# Patient Record
Sex: FEMALE | Race: WHITE | Employment: OTHER | URBAN - METROPOLITAN AREA
[De-identification: names, ages, dates, MRNs, and addresses within clinical notes are randomized per-mention and may not be internally consistent; named-entity substitution may affect disease eponyms.]

---

## 2018-10-31 ENCOUNTER — HOSPITAL ENCOUNTER (INPATIENT)
Age: 83
LOS: 3 days | Discharge: HOSPICE | DRG: 064 | End: 2018-11-03
Attending: EMERGENCY MEDICINE | Admitting: HOSPITALIST
Payer: MEDICARE

## 2018-10-31 ENCOUNTER — APPOINTMENT (OUTPATIENT)
Dept: CT IMAGING | Age: 83
DRG: 064 | End: 2018-10-31
Attending: EMERGENCY MEDICINE
Payer: MEDICARE

## 2018-10-31 ENCOUNTER — APPOINTMENT (OUTPATIENT)
Dept: GENERAL RADIOLOGY | Age: 83
DRG: 064 | End: 2018-10-31
Attending: EMERGENCY MEDICINE
Payer: MEDICARE

## 2018-10-31 DIAGNOSIS — R41.4 RIGHT-SIDED VISUAL NEGLECT: ICD-10-CM

## 2018-10-31 DIAGNOSIS — R13.10 DYSPHAGIA, UNSPECIFIED TYPE: ICD-10-CM

## 2018-10-31 DIAGNOSIS — R41.82 ALTERED MENTAL STATUS, UNSPECIFIED ALTERED MENTAL STATUS TYPE: Primary | ICD-10-CM

## 2018-10-31 DIAGNOSIS — R53.81 PHYSICAL DEBILITY: ICD-10-CM

## 2018-10-31 DIAGNOSIS — E88.09 HYPOALBUMINEMIA: ICD-10-CM

## 2018-10-31 DIAGNOSIS — R63.30 FEEDING DIFFICULTIES: ICD-10-CM

## 2018-10-31 DIAGNOSIS — I63.512 CEREBROVASCULAR ACCIDENT (CVA) DUE TO STENOSIS OF LEFT MIDDLE CEREBRAL ARTERY (HCC): ICD-10-CM

## 2018-10-31 LAB
ALBUMIN SERPL-MCNC: 2.8 G/DL (ref 3.5–5)
ALBUMIN/GLOB SERPL: 0.9 {RATIO} (ref 1.1–2.2)
ALP SERPL-CCNC: 60 U/L (ref 45–117)
ALT SERPL-CCNC: 14 U/L (ref 12–78)
AMMONIA PLAS-SCNC: <10 UMOL/L
ANION GAP SERPL CALC-SCNC: 8 MMOL/L (ref 5–15)
APPEARANCE UR: CLEAR
APTT PPP: 27.6 SEC (ref 22.1–32)
AST SERPL-CCNC: 18 U/L (ref 15–37)
BACTERIA URNS QL MICRO: NEGATIVE /HPF
BASOPHILS # BLD: 0.1 K/UL (ref 0–0.1)
BASOPHILS NFR BLD: 1 % (ref 0–1)
BILIRUB SERPL-MCNC: 0.3 MG/DL (ref 0.2–1)
BILIRUB UR QL: NEGATIVE
BUN SERPL-MCNC: 18 MG/DL (ref 6–20)
BUN/CREAT SERPL: 19 (ref 12–20)
CALCIUM SERPL-MCNC: 8.1 MG/DL (ref 8.5–10.1)
CHLORIDE SERPL-SCNC: 101 MMOL/L (ref 97–108)
CO2 SERPL-SCNC: 29 MMOL/L (ref 21–32)
COLOR UR: ABNORMAL
COMMENT, HOLDF: NORMAL
CREAT SERPL-MCNC: 0.94 MG/DL (ref 0.55–1.02)
DIFFERENTIAL METHOD BLD: ABNORMAL
EOSINOPHIL # BLD: 0.2 K/UL (ref 0–0.4)
EOSINOPHIL NFR BLD: 3 % (ref 0–7)
EPITH CASTS URNS QL MICRO: ABNORMAL /LPF
ERYTHROCYTE [DISTWIDTH] IN BLOOD BY AUTOMATED COUNT: 13.3 % (ref 11.5–14.5)
GLOBULIN SER CALC-MCNC: 3 G/DL (ref 2–4)
GLUCOSE SERPL-MCNC: 104 MG/DL (ref 65–100)
GLUCOSE UR STRIP.AUTO-MCNC: NEGATIVE MG/DL
HCT VFR BLD AUTO: 32 % (ref 35–47)
HGB BLD-MCNC: 10.1 G/DL (ref 11.5–16)
HGB UR QL STRIP: NEGATIVE
HYALINE CASTS URNS QL MICRO: ABNORMAL /LPF (ref 0–5)
IMM GRANULOCYTES # BLD: 0 K/UL (ref 0–0.04)
IMM GRANULOCYTES NFR BLD AUTO: 0 % (ref 0–0.5)
INR PPP: 1.1 (ref 0.9–1.1)
KETONES UR QL STRIP.AUTO: NEGATIVE MG/DL
LEUKOCYTE ESTERASE UR QL STRIP.AUTO: ABNORMAL
LYMPHOCYTES # BLD: 1 K/UL (ref 0.8–3.5)
LYMPHOCYTES NFR BLD: 13 % (ref 12–49)
MCH RBC QN AUTO: 32.5 PG (ref 26–34)
MCHC RBC AUTO-ENTMCNC: 31.6 G/DL (ref 30–36.5)
MCV RBC AUTO: 102.9 FL (ref 80–99)
MONOCYTES # BLD: 1 K/UL (ref 0–1)
MONOCYTES NFR BLD: 12 % (ref 5–13)
NEUTS SEG # BLD: 5.5 K/UL (ref 1.8–8)
NEUTS SEG NFR BLD: 71 % (ref 32–75)
NITRITE UR QL STRIP.AUTO: NEGATIVE
NRBC # BLD: 0 K/UL (ref 0–0.01)
NRBC BLD-RTO: 0 PER 100 WBC
PH UR STRIP: 6.5 [PH] (ref 5–8)
PLATELET # BLD AUTO: 166 K/UL (ref 150–400)
PMV BLD AUTO: 12.6 FL (ref 8.9–12.9)
POTASSIUM SERPL-SCNC: 4 MMOL/L (ref 3.5–5.1)
PROT SERPL-MCNC: 5.8 G/DL (ref 6.4–8.2)
PROT UR STRIP-MCNC: NEGATIVE MG/DL
PROTHROMBIN TIME: 11.2 SEC (ref 9–11.1)
RBC # BLD AUTO: 3.11 M/UL (ref 3.8–5.2)
RBC #/AREA URNS HPF: ABNORMAL /HPF (ref 0–5)
SAMPLES BEING HELD,HOLD: NORMAL
SODIUM SERPL-SCNC: 138 MMOL/L (ref 136–145)
SP GR UR REFRACTOMETRY: 1.03 (ref 1–1.03)
THERAPEUTIC RANGE,PTTT: NORMAL SECS (ref 58–77)
TROPONIN I SERPL-MCNC: <0.05 NG/ML
UR CULT HOLD, URHOLD: NORMAL
UROBILINOGEN UR QL STRIP.AUTO: 0.2 EU/DL (ref 0.2–1)
WBC # BLD AUTO: 7.7 K/UL (ref 3.6–11)
WBC URNS QL MICRO: ABNORMAL /HPF (ref 0–4)

## 2018-10-31 PROCEDURE — 71045 X-RAY EXAM CHEST 1 VIEW: CPT

## 2018-10-31 PROCEDURE — 74011000258 HC RX REV CODE- 258: Performed by: EMERGENCY MEDICINE

## 2018-10-31 PROCEDURE — 36415 COLL VENOUS BLD VENIPUNCTURE: CPT | Performed by: EMERGENCY MEDICINE

## 2018-10-31 PROCEDURE — 85025 COMPLETE CBC W/AUTO DIFF WBC: CPT | Performed by: EMERGENCY MEDICINE

## 2018-10-31 PROCEDURE — 99285 EMERGENCY DEPT VISIT HI MDM: CPT

## 2018-10-31 PROCEDURE — 65660000000 HC RM CCU STEPDOWN

## 2018-10-31 PROCEDURE — 85610 PROTHROMBIN TIME: CPT | Performed by: EMERGENCY MEDICINE

## 2018-10-31 PROCEDURE — 84484 ASSAY OF TROPONIN QUANT: CPT | Performed by: EMERGENCY MEDICINE

## 2018-10-31 PROCEDURE — 93005 ELECTROCARDIOGRAM TRACING: CPT

## 2018-10-31 PROCEDURE — 74011636320 HC RX REV CODE- 636/320: Performed by: EMERGENCY MEDICINE

## 2018-10-31 PROCEDURE — 96360 HYDRATION IV INFUSION INIT: CPT

## 2018-10-31 PROCEDURE — 70450 CT HEAD/BRAIN W/O DYE: CPT

## 2018-10-31 PROCEDURE — 70498 CT ANGIOGRAPHY NECK: CPT

## 2018-10-31 PROCEDURE — 74011250636 HC RX REV CODE- 250/636: Performed by: HOSPITALIST

## 2018-10-31 PROCEDURE — 81001 URINALYSIS AUTO W/SCOPE: CPT | Performed by: EMERGENCY MEDICINE

## 2018-10-31 PROCEDURE — 80053 COMPREHEN METABOLIC PANEL: CPT | Performed by: EMERGENCY MEDICINE

## 2018-10-31 PROCEDURE — 82140 ASSAY OF AMMONIA: CPT | Performed by: EMERGENCY MEDICINE

## 2018-10-31 PROCEDURE — 74011250637 HC RX REV CODE- 250/637: Performed by: HOSPITALIST

## 2018-10-31 PROCEDURE — 85730 THROMBOPLASTIN TIME PARTIAL: CPT | Performed by: EMERGENCY MEDICINE

## 2018-10-31 PROCEDURE — 74011250636 HC RX REV CODE- 250/636: Performed by: EMERGENCY MEDICINE

## 2018-10-31 RX ORDER — FERROUS SULFATE, DRIED 160(50) MG
1 TABLET, EXTENDED RELEASE ORAL DAILY
Status: DISCONTINUED | OUTPATIENT
Start: 2018-11-01 | End: 2018-11-03 | Stop reason: HOSPADM

## 2018-10-31 RX ORDER — CLOPIDOGREL BISULFATE 75 MG/1
75 TABLET ORAL DAILY
Status: DISCONTINUED | OUTPATIENT
Start: 2018-11-01 | End: 2018-11-03 | Stop reason: HOSPADM

## 2018-10-31 RX ORDER — FAMOTIDINE 20 MG/1
20 TABLET, FILM COATED ORAL DAILY
Status: DISCONTINUED | OUTPATIENT
Start: 2018-11-01 | End: 2018-11-03 | Stop reason: HOSPADM

## 2018-10-31 RX ORDER — LEVOTHYROXINE SODIUM 75 UG/1
75 TABLET ORAL
Status: DISCONTINUED | OUTPATIENT
Start: 2018-11-01 | End: 2018-11-03 | Stop reason: HOSPADM

## 2018-10-31 RX ORDER — LOSARTAN POTASSIUM 50 MG/1
50 TABLET ORAL
Status: DISCONTINUED | OUTPATIENT
Start: 2018-10-31 | End: 2018-11-03 | Stop reason: HOSPADM

## 2018-10-31 RX ORDER — THERA TABS 400 MCG
1 TAB ORAL DAILY
Status: DISCONTINUED | OUTPATIENT
Start: 2018-11-01 | End: 2018-11-03 | Stop reason: HOSPADM

## 2018-10-31 RX ORDER — SODIUM CHLORIDE 9 MG/ML
75 INJECTION, SOLUTION INTRAVENOUS CONTINUOUS
Status: DISPENSED | OUTPATIENT
Start: 2018-10-31 | End: 2018-11-01

## 2018-10-31 RX ORDER — SODIUM CHLORIDE 0.9 % (FLUSH) 0.9 %
5-10 SYRINGE (ML) INJECTION AS NEEDED
Status: DISCONTINUED | OUTPATIENT
Start: 2018-10-31 | End: 2018-11-03 | Stop reason: HOSPADM

## 2018-10-31 RX ORDER — LEVOTHYROXINE SODIUM 50 UG/1
50 TABLET ORAL EVERY OTHER DAY
Status: DISCONTINUED | OUTPATIENT
Start: 2018-11-01 | End: 2018-10-31

## 2018-10-31 RX ORDER — LANOLIN ALCOHOL/MO/W.PET/CERES
1000 CREAM (GRAM) TOPICAL DAILY
COMMUNITY
End: 2018-11-03

## 2018-10-31 RX ORDER — SODIUM CHLORIDE 0.9 % (FLUSH) 0.9 %
5-10 SYRINGE (ML) INJECTION EVERY 8 HOURS
Status: DISCONTINUED | OUTPATIENT
Start: 2018-10-31 | End: 2018-11-03 | Stop reason: HOSPADM

## 2018-10-31 RX ORDER — ASPIRIN 81 MG/1
162 TABLET ORAL 2 TIMES DAILY
Status: DISCONTINUED | OUTPATIENT
Start: 2018-10-31 | End: 2018-11-01

## 2018-10-31 RX ORDER — ASPIRIN 81 MG/1
162 TABLET ORAL 2 TIMES DAILY
COMMUNITY

## 2018-10-31 RX ORDER — ACETAMINOPHEN 650 MG/1
650 SUPPOSITORY RECTAL
Status: DISCONTINUED | OUTPATIENT
Start: 2018-10-31 | End: 2018-11-03 | Stop reason: HOSPADM

## 2018-10-31 RX ORDER — FUROSEMIDE 40 MG/1
40 TABLET ORAL DAILY
COMMUNITY

## 2018-10-31 RX ORDER — LABETALOL 100 MG/1
200 TABLET, FILM COATED ORAL 2 TIMES DAILY
Status: DISCONTINUED | OUTPATIENT
Start: 2018-10-31 | End: 2018-11-03 | Stop reason: HOSPADM

## 2018-10-31 RX ORDER — ACETAMINOPHEN 325 MG/1
650 TABLET ORAL
Status: DISCONTINUED | OUTPATIENT
Start: 2018-10-31 | End: 2018-11-03 | Stop reason: HOSPADM

## 2018-10-31 RX ORDER — PRAVASTATIN SODIUM 10 MG/1
10 TABLET ORAL
Status: DISCONTINUED | OUTPATIENT
Start: 2018-10-31 | End: 2018-10-31

## 2018-10-31 RX ORDER — FUROSEMIDE 40 MG/1
40 TABLET ORAL DAILY
Status: DISCONTINUED | OUTPATIENT
Start: 2018-11-01 | End: 2018-11-03 | Stop reason: HOSPADM

## 2018-10-31 RX ORDER — CHOLECALCIFEROL (VITAMIN D3) 125 MCG
1 CAPSULE ORAL DAILY
COMMUNITY
End: 2018-11-03

## 2018-10-31 RX ORDER — SODIUM CHLORIDE 0.9 % (FLUSH) 0.9 %
10 SYRINGE (ML) INJECTION
Status: COMPLETED | OUTPATIENT
Start: 2018-10-31 | End: 2018-10-31

## 2018-10-31 RX ORDER — LEVOTHYROXINE SODIUM 75 UG/1
75 TABLET ORAL EVERY OTHER DAY
Status: DISCONTINUED | OUTPATIENT
Start: 2018-11-02 | End: 2018-10-31

## 2018-10-31 RX ORDER — PRAVASTATIN SODIUM 20 MG/1
20 TABLET ORAL
COMMUNITY

## 2018-10-31 RX ORDER — RIVASTIGMINE 4.6 MG/24H
1 PATCH, EXTENDED RELEASE TRANSDERMAL DAILY
Status: DISCONTINUED | OUTPATIENT
Start: 2018-11-01 | End: 2018-10-31

## 2018-10-31 RX ORDER — ONDANSETRON 2 MG/ML
4 INJECTION INTRAMUSCULAR; INTRAVENOUS
Status: DISCONTINUED | OUTPATIENT
Start: 2018-10-31 | End: 2018-11-03 | Stop reason: HOSPADM

## 2018-10-31 RX ORDER — PRAVASTATIN SODIUM 20 MG/1
20 TABLET ORAL
Status: DISCONTINUED | OUTPATIENT
Start: 2018-10-31 | End: 2018-11-03 | Stop reason: HOSPADM

## 2018-10-31 RX ADMIN — LOSARTAN POTASSIUM 50 MG: 50 TABLET ORAL at 21:00

## 2018-10-31 RX ADMIN — ASPIRIN 162 MG: 81 TABLET, COATED ORAL at 21:00

## 2018-10-31 RX ADMIN — SODIUM CHLORIDE 75 ML/HR: 900 INJECTION, SOLUTION INTRAVENOUS at 22:00

## 2018-10-31 RX ADMIN — IOPAMIDOL 100 ML: 755 INJECTION, SOLUTION INTRAVENOUS at 14:35

## 2018-10-31 RX ADMIN — PRAVASTATIN SODIUM 20 MG: 20 TABLET ORAL at 21:01

## 2018-10-31 RX ADMIN — Medication 10 ML: at 14:35

## 2018-10-31 RX ADMIN — SODIUM CHLORIDE 1000 ML: 900 INJECTION, SOLUTION INTRAVENOUS at 15:07

## 2018-10-31 RX ADMIN — Medication 10 ML: at 21:04

## 2018-10-31 RX ADMIN — LABETALOL HYDROCHLORIDE 200 MG: 100 TABLET, FILM COATED ORAL at 21:01

## 2018-10-31 RX ADMIN — SODIUM CHLORIDE 100 ML: 900 INJECTION, SOLUTION INTRAVENOUS at 14:35

## 2018-10-31 NOTE — ED NOTES
Attempted to use the bedpan but could not seem to understand. She keeps trying to get up saying she needs to \"go\". She pulled her IV out just in the constant movement. Her daughter is with her. She was placed in a diaper.

## 2018-10-31 NOTE — ED PROVIDER NOTES
80 y.o. female with past medical history significant for HTN, stroke, CAD, and CKD who presents from home via EMS with chief complaint of AMS. History is provided by EMS. Patient's daughter noted at 1000 this morning the patient was not \"acting herself\". Daughter told EMS that this will usually self-resolve, but today the patient \"never came out of it\". Patient has a h/o TIAs. Blood sugar 185 en route, with right-sided weakness noted by EMS. There are no other acute medical concerns at this time. Full history, physical exam, and ROS unable to be obtained due to:  AMS; patient not answering questions at this time. Social hx: Nonsmoker; Occasional EtOH use PCP: Hayley Denton MD 
 
Note written by Tae Morejon, as dictated by Vance Fishman MD 2:28 PM 
 
 
The history is provided by the EMS personnel. The history is limited by the condition of the patient. No  was used. Past Medical History:  
Diagnosis Date  CAD (coronary artery disease)  Chronic kidney disease   
 stent in artery of right kidney  Endocrine disease  High cholesterol  Hypertension  Hypothyroid  Stroke ([facility] [address]   
 tia Past Surgical History:  
Procedure Laterality Date  CARDIAC SURG PROCEDURE UNLIST    
 cardiac stent Family History:  
Problem Relation Age of Onset  Heart Disease Mother  Stroke Mother  Heart Disease Father  Heart Disease Brother Social History Socioeconomic History  Marital status:  Spouse name: Not on file  Number of children: Not on file  Years of education: Not on file  Highest education level: Not on file Social Needs  Financial resource strain: Not on file  Food insecurity - worry: Not on file  Food insecurity - inability: Not on file  Transportation needs - medical: Not on file  Transportation needs - non-medical: Not on file Occupational History  Not on file Tobacco Use  Smoking status: Never Smoker Substance and Sexual Activity  Alcohol use: Yes Comment: occasional  
 Drug use: Not on file  Sexual activity: Not on file Other Topics Concern  Not on file Social History Narrative  Not on file ALLERGIES: Sulfa (sulfonamide antibiotics) Review of Systems Unable to perform ROS: Mental status change There were no vitals filed for this visit. Physical Exam  
Constitutional: She appears well-developed and well-nourished. No distress. HENT:  
Head: Normocephalic and atraumatic. Mouth/Throat: Oropharynx is clear and moist.  
Eyes: Pupils are equal, round, and reactive to light. No scleral icterus. Neck: Normal range of motion. Neck supple. No thyromegaly present. Cardiovascular: Normal rate, regular rhythm, normal heart sounds and intact distal pulses. No murmur heard. Pulmonary/Chest: Effort normal and breath sounds normal. No respiratory distress. Abdominal: Soft. Bowel sounds are normal. She exhibits no distension. There is no tenderness. Musculoskeletal: She exhibits no edema. Neurological: She is alert. Hemiparesis to RUE. Gaze deviated to left. Intermittently follows commands, but is not answering questions. Awake and alert. Skin: Skin is warm and dry. No rash noted. She is not diaphoretic. Nursing note and vitals reviewed. Note written by Tae Morejon, as dictated by Shiraz Aleman MD 2:28 PM 
 
MDM Procedures CONSULT NOTE: 
2:35 PM Shiraz Aleman MD spoke with Dr. Gabe Talbot, Consult for Tele-Neurology. Discussed available diagnostic tests and clinical findings. Dr. Gabe Talbot will see the patient. 2:41 PM 
Daughter at bedside. She states the patient has spells of weakness and garbled speech intermittently. She states the patient was last seen normal last night after she showered and went to bed.  Patient was checked on around 1030 by the daughter, noted to be with symptoms of confusion and garbled speech that did not resolve on their own as usual, prompting the EMS call. Daughter is expressing interest in palliative care, does not want any intervention at this time. 2:42 PM 
Radiologist called, patient has a new hypodensity noted on CT, does not suspect it is acute. 2:44 PM 
Dr. Jannteh Weber called back, discussed new information and CT results. Agrees with plan without tele-evaluation. CONSULT NOTE: 
3:45 PM Keon Avila MD communicated with Dr. Sol Morales, Consult for Hospitalist via [facility] Text. Discussed available diagnostic tests and clinical findings. He will see and admit the patient.

## 2018-10-31 NOTE — PROGRESS NOTES
Admission Medication Reconciliation: 
 
Information obtained from: pt's daughter, home list, Rx Query Significant PMH/Disease States:  
Past Medical History:  
Diagnosis Date  CAD (coronary artery disease)  Chronic kidney disease   
 stent in artery of right kidney  Endocrine disease  High cholesterol  Hypertension  Hypothyroid  Stroke ([facility] [address]   
 tia Chief Complaint for this Admission:  extremity weakness Allergies:  Sulfa (sulfonamide antibiotics) Prior to Admission Medications:  
Prior to Admission Medications Prescriptions Last Dose Informant Patient Reported? Taking? B.infantis-B.ani-B.long-B.bifi (PROBIOTIC 4X) 10-15 mg TbEC  Family Member Yes Yes Sig: Take 1 capsule by mouth daily. Calcium Carbonate-Vit D3-Min (CALTRATE 600+D PLUS MINERALS) 600 mg calcium- 400 unit tab  Family Member Yes Yes Sig: Take 1 tablet by mouth daily. KRILL/OM3/DHA/EPA/OM6/LIP/ASTX (KRILL OIL, OMEGA 3 & 6, PO)  Family Member Yes Yes Sig: Take 1 capsule by mouth daily. aspirin delayed-release 81 mg tablet 10/31/2018 at am  Yes Yes Sig: Take 162 mg by mouth two (2) times a day. biotin 2,500 mcg tab  Family Member Yes Yes Sig: Take 3,000 mcg by mouth. cholecalciferol, vitamin D3, (VITAMIN D3) 2,000 unit tab   Yes Yes Sig: Take 1 Tab by mouth daily. clopidogrel (PLAVIX) 75 mg tablet 10/31/2018 at am Family Member Yes Yes Sig: Take 75 mg by mouth daily. cyanocobalamin (VITAMIN B-12) 1,000 mcg tablet   Yes Yes Sig: Take 1,000 mcg by mouth daily. furosemide (LASIX) 40 mg tablet 10/30/2018 at am  Yes Yes Sig: Take 40 mg by mouth daily. labetalol (NORMODYNE) 200 mg tablet 10/31/2018 at am Family Member Yes Yes Sig: Take 200 mg by mouth two (2) times a day. levothyroxine (SYNTHROID) 75 mcg tablet 10/31/2018 at am Family Member Yes Yes Sig: Take 75 mcg by mouth every other day. Alternate with 50mcg dose olmesartan (BENICAR) 20 mg tablet 10/30/2018 at pm Family Member Yes Yes Sig: Take 20 mg by mouth nightly. pravastatin (PRAVACHOL) 20 mg tablet 10/30/2018 at pm  Yes Yes Sig: Take 20 mg by mouth nightly. ranitidine (ZANTAC) 150 mg tablet 10/31/2018 at am Family Member Yes Yes Sig: Take 150 mg by mouth two (2) times a day. Facility-Administered Medications: None Comments/Recommendations: Reviewed medications w/ pt's daughter who presents a list from home. Rx Query is also used (1) Add: furosemide, vitamin B-12, vitamin D3 
 
(2) Remove: Synthroid 50mcg, rivastigamine, multivitamin 
 
(3) Change:  
aspirin 325mg every day to 162mg BID Olmesartan 40mg every day to 20 mg every day Pravastatin 10mg QHS to 20mg QHS Pt has taken all morning doses today except furosemide. Per pt's daughter, pt takes 2 tabs of baby aspirin twice daily. Pt is no longer use rivastigamine patch per doctor's order due to poor response. Meds were ordered before med rec was completed. Here are changes that needs to be addressed to the prescriber:  
- Pt takes Synthroid 75mcg every morning not 50mcg - pt takes olmesartan 20mg qhs not 40mg 
- pt takes pravastatin 20mg qhs instead of 10mg 
- pt does not use rivastigamine patch 
- pt takes 162mg asprin BID (already had one dose today) - Pt is also on furosemide, vitamin B-12 and D3 Milla Davila PharmD candidate

## 2018-10-31 NOTE — PROGRESS NOTES
Bedside and Verbal shift [facility] by Vanesa Chiu (offgoing nurse). Report included the following information SBAR, Kardex, MAR and Cardiac Rhythm NSR.

## 2018-10-31 NOTE — H&P
[address] HISTORY AND PHYSICAL Karl Griffin 
MR#: [id number] : 1926 ACCOUNT #: [de-identified] ADMIT DATE: 10/31/2018 ADMISSION TIME:  4:50 p.m. ATTENDING:  Zaynab Vazquez MD 
 
PRIMARY CARE PHYSICIAN:  Edna Diego II, MD 
 
CHIEF COMPLAINT:  Changes in mental status this morning. HISTORY OF PRESENT ILLNESS:  Please note that this whole history is as per the patient's daughter. The patient had changes in mental status and she was not able to contribute anything significantly to her HPI. Briefly, this is a 80-year-old  female with a past medical history of hypertension, multiple TIAs, coronary artery disease. She comes over here because of changes in mental status that was noted around 10:00 to 10:30 a.m. this morning. According to the daughter, the patient was quite at her baseline yesterday at around 10 p.m. when she had her regular meal, and had a shower, and then she had gone up to bed. Overnight, the patient must have woken up once and had used the restroom at her baseline, but this morning between 10:00 to 10:30 when the daughter went in the patient's room to check on her, the patient was sitting on her chair and had significant slurred speech, was not comprehending as usual, and had gaze on her left side. The daughter mentioned that this has happened in the past quite frequently, but the patient usually comes back to her baseline pretty quickly. This time, even after waiting for a couple of hours, the patient did not come back to her usual self; that is when the daughter brought the patient into the ER. No chest pain, shortness of breath, headache, dizziness, cough, fever. No diarrhea. No other changes in bowel movements. REVIEW OF SYSTEMS:  Pertinent positives as above. Rest of review of systems were done. They are all negative except for above. PAST MEDICAL HISTORY: 
1. Hypertension. 2.  Coronary artery disease. 3.  Chronic kidney disease. 4.  Hypothyroidism. 5.  Hypercholesterolemia. 6.  Multiple TIAs. PAST SURGICAL HISTORY:  Cardiac stent FAMILY HISTORY:  Significant for coronary artery disease and stroke. SOCIAL HISTORY:  Never smoked. Drinks wine almost on a daily basis. ALLERGIES:  TO SULFA. HOME MEDICATIONS:  The patient is on the following medications: 1. Aspirin 325 mg p.o. daily. 2.  Probiotic 1 capsule daily. 3.  Biotin 2000 mcg p.o. daily. 4.  Plavix 75 mg p.o. daily. 5.  Labetalol 200 mg p.o. b.i.d. 6.  Synthroid 50 mcg p.o. every other day, and 75 mcg every other day. 7.  Multivitamin 1 p.o. daily. 8.  Benicar ER 40 mg p.o. at bedtime. 9.  Pravastatin 10 mg p.o. at bedtime. 10.  Zantac 150 mg p.o. b.i.d. 11.  Exelon patch 1 daily. PHYSICAL EXAMINATION: 
VITAL SIGNS:  At the time when the patient was seen, the patient's vitals were as follows:  Blood pressure 108/82, pulse 66, respiratory rate 15, saturating 95% on room air. GENERAL:  Not in acute distress, comfortable, lying in bed. HEENT:  Head:  Normocephalic, atraumatic. Eyes:  She has left gaze. I was not able to really appreciate any facial droop. Eyes:  PERRLA, EOMI. Ears:  Bilateral hearing normal.  No growth. NOSE:  No polyp, no bleeding. Mouth:  Dry mucous membranes, decent oral hygiene. NECK:  Supple. No JVD, no thyromegaly. CHEST:  Clear to auscultation bilaterally. No adventitious breath sounds. CARDIOVASCULAR:  S1, S2 normal.  No murmurs, rubs, or gallops. GASTROINTESTINAL:  Bowel sounds present. Soft, nontender, nondistended. NEUROLOGIC:  Cranial nerves II-XII intact. Motor seems 5/5 bilaterally upper limb and lower limb; although the daughter claims that the patient seems to be doing better, but still has a left gaze. According to the ER physician's physical examination, the patient had hemiparesis on right upper extremity, which I was not really able to appreciate.   The patient still has some slurred speech and word finding difficulties, but she is alert and awake. PSYCHIATRIC:  Mood and affect appropriate. Oriented x0. SKIN:  No rashes, no ulcers. LABORATORIES AND RADIOLOGICAL RESULTS:  WBC 7.7, hemoglobin 10.1, hematocrit 32, and a platelet count of 525. Urinalysis did not show any signs of infection. INR is 1.1. Sodium 138, potassium 4.0, chloride 101, bicarb 29, glucose 104, BUN 18, creatinine 0.94. CT scan of the head was done, which shows possible new infarct at the junction of the right rubina and midbrain, age indeterminate. CTA CODE NEURO showed no acute large vessel arterial occlusion, moderate-to-severe intracranial atherosclerosis with severe critical stenosis in the proximal posterior division M2 segment, left MCA. Chest x-ray:  Mild central congestion. EKG shows sinus rhythm. No ST-T wave changes or history of ischemia. ASSESSMENT AND PLAN:  This is a 80-year-old female with past medical history of hypertension, multiple transient ischemic attacks, hypercholesterolemia, hypothyroidism. She comes over here because of changes in mental status noted this morning. 1.  Changes in mental status likely secondary to transient ischemic attack/stroke. I will admit the patient as inpatient, since the patient still has neurological deficit from her baseline. Will put the patient on IV fluids. The patient's daughter is already leaning towards palliative care. We decided on consulting the neurologist, and only if the neurologist recommends getting any other imaging studies then we will do it. For now, we will also consult Palliative Care as well as Physical Therapy on the patient. 2.  History of multiple transient ischemic attacks. The patient is on aspirin and Plavix. We will continue that. 3.  Hypothyroidism. Stable on Synthroid. I spent about 50 minutes in taking care of the patient. THE PATIENT IS DNR.  
 
 
Ronnie Phillips MD 
 
  
PG/JN 
 D: 10/31/2018 17:00    
T: 10/31/2018 19:39 
JOB #: [id number]

## 2018-10-31 NOTE — PROGRESS NOTES
H&P [facility]#[id number] [facility] Willian Jackson MD

## 2018-10-31 NOTE — ED TRIAGE NOTES
Pt comes in via EMS with c/c of R sided extremity weakness. Pt's daughter said at 10am her mother didn't \"seem like herself\".  en route.  Pt's eye deviated to left and R sided weakness upon arrival.

## 2018-10-31 NOTE — ROUTINE PROCESS
TRANSFER - OUT REPORT: 
 
Verbal report given to Easiaid (name) on Maria Isabel Lee  being transferred to Room 678(unit) for routine progression of care Report consisted of patients Situation, Background, Assessment and  
Recommendations(SBAR). Information from the following report(s) SBAR, Kardex, ED Summary, MAR, Recent Results and Cardiac Rhythm NSR was reviewed with the receiving nurse. Lines:  
Peripheral IV 10/31/18 Left Antecubital (Active) Peripheral IV 10/31/18 Right Forearm (Active) Opportunity for questions and clarification was provided. Patient transported with: 
 [place]

## 2018-11-01 LAB
ATRIAL RATE: 63 BPM
CALCULATED P AXIS, ECG09: 46 DEGREES
CALCULATED R AXIS, ECG10: -5 DEGREES
CALCULATED T AXIS, ECG11: -7 DEGREES
CHOLEST SERPL-MCNC: 187 MG/DL
DIAGNOSIS, 93000: NORMAL
ERYTHROCYTE [DISTWIDTH] IN BLOOD BY AUTOMATED COUNT: 13.3 % (ref 11.5–14.5)
EST. AVERAGE GLUCOSE BLD GHB EST-MCNC: 123 MG/DL
HBA1C MFR BLD: 5.9 % (ref 4.2–6.3)
HCT VFR BLD AUTO: 37.6 % (ref 35–47)
HDLC SERPL-MCNC: 80 MG/DL
HDLC SERPL: 2.3 {RATIO} (ref 0–5)
HGB BLD-MCNC: 12 G/DL (ref 11.5–16)
LDLC SERPL CALC-MCNC: 95.8 MG/DL (ref 0–100)
LIPID PROFILE,FLP: NORMAL
MCH RBC QN AUTO: 32.2 PG (ref 26–34)
MCHC RBC AUTO-ENTMCNC: 31.9 G/DL (ref 30–36.5)
MCV RBC AUTO: 100.8 FL (ref 80–99)
NRBC # BLD: 0 K/UL (ref 0–0.01)
NRBC BLD-RTO: 0 PER 100 WBC
P-R INTERVAL, ECG05: 188 MS
PLATELET # BLD AUTO: 190 K/UL (ref 150–400)
PMV BLD AUTO: 13 FL (ref 8.9–12.9)
Q-T INTERVAL, ECG07: 434 MS
QRS DURATION, ECG06: 84 MS
QTC CALCULATION (BEZET), ECG08: 444 MS
RBC # BLD AUTO: 3.73 M/UL (ref 3.8–5.2)
TRIGL SERPL-MCNC: 56 MG/DL (ref ?–150)
VENTRICULAR RATE, ECG03: 63 BPM
VLDLC SERPL CALC-MCNC: 11.2 MG/DL
WBC # BLD AUTO: 12.4 K/UL (ref 3.6–11)

## 2018-11-01 PROCEDURE — 83036 HEMOGLOBIN GLYCOSYLATED A1C: CPT | Performed by: HOSPITALIST

## 2018-11-01 PROCEDURE — 92610 EVALUATE SWALLOWING FUNCTION: CPT | Performed by: SPEECH-LANGUAGE PATHOLOGIST

## 2018-11-01 PROCEDURE — 85027 COMPLETE CBC AUTOMATED: CPT | Performed by: HOSPITALIST

## 2018-11-01 PROCEDURE — 97167 OT EVAL HIGH COMPLEX 60 MIN: CPT

## 2018-11-01 PROCEDURE — 97161 PT EVAL LOW COMPLEX 20 MIN: CPT

## 2018-11-01 PROCEDURE — 97535 SELF CARE MNGMENT TRAINING: CPT

## 2018-11-01 PROCEDURE — 76450000000

## 2018-11-01 PROCEDURE — 65660000000 HC RM CCU STEPDOWN

## 2018-11-01 PROCEDURE — 36415 COLL VENOUS BLD VENIPUNCTURE: CPT | Performed by: HOSPITALIST

## 2018-11-01 PROCEDURE — 80061 LIPID PANEL: CPT | Performed by: HOSPITALIST

## 2018-11-01 PROCEDURE — 97116 GAIT TRAINING THERAPY: CPT

## 2018-11-01 PROCEDURE — 74011250637 HC RX REV CODE- 250/637: Performed by: HOSPITALIST

## 2018-11-01 RX ORDER — GUAIFENESIN 100 MG/5ML
162 LIQUID (ML) ORAL 2 TIMES DAILY
Status: DISCONTINUED | OUTPATIENT
Start: 2018-11-01 | End: 2018-11-03 | Stop reason: HOSPADM

## 2018-11-01 RX ADMIN — FUROSEMIDE 40 MG: 40 TABLET ORAL at 08:49

## 2018-11-01 RX ADMIN — LABETALOL HYDROCHLORIDE 200 MG: 100 TABLET, FILM COATED ORAL at 08:49

## 2018-11-01 RX ADMIN — Medication 10 ML: at 22:33

## 2018-11-01 RX ADMIN — LABETALOL HYDROCHLORIDE 200 MG: 100 TABLET, FILM COATED ORAL at 17:18

## 2018-11-01 RX ADMIN — THERA TABS 1 TABLET: TAB at 08:49

## 2018-11-01 RX ADMIN — LOSARTAN POTASSIUM 50 MG: 50 TABLET ORAL at 22:33

## 2018-11-01 RX ADMIN — ASPIRIN 81 MG CHEWABLE TABLET 162 MG: 81 TABLET CHEWABLE at 17:18

## 2018-11-01 RX ADMIN — Medication 10 ML: at 07:05

## 2018-11-01 RX ADMIN — PRAVASTATIN SODIUM 20 MG: 20 TABLET ORAL at 22:33

## 2018-11-01 RX ADMIN — Medication 1 CAPSULE: at 08:49

## 2018-11-01 RX ADMIN — CALCIUM CARBONATE-VITAMIN D TAB 500 MG-200 UNIT 1 TABLET: 500-200 TAB at 08:49

## 2018-11-01 RX ADMIN — LEVOTHYROXINE SODIUM 75 MCG: 75 TABLET ORAL at 07:05

## 2018-11-01 RX ADMIN — CLOPIDOGREL BISULFATE 75 MG: 75 TABLET ORAL at 08:49

## 2018-11-01 RX ADMIN — Medication 10 ML: at 17:17

## 2018-11-01 RX ADMIN — FAMOTIDINE 20 MG: 20 TABLET ORAL at 08:49

## 2018-11-01 NOTE — PROGRESS NOTES
Spiritual Care Assessment/Progress Note [address] 
 
 
NAME: Johanna Moura      MRN: [id number] AGE: 80 y.o. SEX: female Pentecostal Affiliation: Religious  
Language: English  
 
11/1/2018     Total Time (in minutes): 19 Spiritual Assessment begun in [facility] 6S NEURO-SCI TELE through conversation with: 
  
    []Patient        [x] Family    [] Friend(s) Reason for Consult: Palliative Care, Initial/Spiritual Assessment Spiritual beliefs: (Please include comment if needed) [x] Identifies with a jag tradition:  Tiffanie Sanchez   
   [] Supported by a jag community:        
   [] Claims no spiritual orientation:       
   [] Seeking spiritual identity:            
   [] Adheres to an individual form of spirituality:       
   [] Not able to assess:                   
 
    
Identified resources for coping:  
   [] Prayer                           
   [] Music                  [] Guided Imagery [x] Family/friends                 [] Pet visits [] Devotional reading                         [] Unknown 
   [] Other Interventions offered during this visit: (See comments for more details) Patient Interventions: Initial/Spiritual assessment, patient floor Family/Friend(s): Affirmation of emotions/emotional suffering, Catharsis/review of pertinent events in supportive environment, Prayer (assurance of), End of life issues discussed, Initial Assessment, Pentecostal beliefs/image of God discussed Plan of Care: 
 
 [] Support spiritual and/or cultural needs  
 [] Support AMD and/or advance care planning process    
 [] Support grieving process 
 [] Coordinate Rites and/or Rituals  
 [] Coordination with community clergy [] No spiritual needs identified at this time 
 [] Detailed Plan of Care below (See Comments)  [] Make referral to Music Therapy 
[] Make referral to Pet Therapy    
[] Make referral to Addiction services [] Make referral to [facility] 
[] Make referral to Spiritual Care Partner 
[] No future visits requested       
[x] Follow up visits as needed Comments: Visited with pt's daughter and son-in-law who were present at bedside. Pt was mostly resting during visit. Offered listening presence and emotional support as daughter shared an update on pt's condition. Pt lives with daughter and ; daughter is a retired nurse. Pt has completed  Advance Care Planning documents in the past and daughter indicates their desire to honor pt's wishes when it comes to end of life. They are planning to speak with Palliative NP Herbie Velazquez shortly. Pt and family attend an [facility] in [place]. They have an interim  at this time. Offered to arrange a visit from a [facility]  if desired. Family declined this offer but will let us know if they change their mind. They have a  (friend) in [place] who may visit. Chaplains are available for support as needed. Offered assurance of prayers. Jonelle Rajput, Palliative

## 2018-11-01 NOTE — CONSULTS
Consult dictated. Baseline dementia, now admitted with gaze preference to the left, aphasia and RLE weakness. She has had a L MCA infarct related to intracranial atherosclerosis, despite being on dual antiplatelet therapy. Treatment essentially is supportive and agree with palliative evaluation and possible transitioning to hospice.     Annabelle Thakur MD

## 2018-11-01 NOTE — CONSULTS
Palliative Medicine Consult  Rodriguez: [phone] ([phone])    Patient Name: Lane Vasquez  YOB: 1926    Date of Initial Consult: November 1, 2018  Reason for Consult: Care Decisions  Requesting Provider: Dr. Bijan Goyal   Primary Care Physician: Joseph Tenorio MD     SUMMARY:   Lane Vasquez is a 80 y.o. with a past history of CAD, HTN, multiple TIA\"s, CKD, hypothyroidism, hypercholesteremia. who was admitted on 10/31/2018 from home  with a diagnosis of L MCA infarct related to intracranial atherosclerosis despite dual antiplatelet therapy. Current medical issues leading to Palliative Medicine involvement include: support with care decisions given recurrent TIA's now stroke. Hospice appropriate. Social: , 2 children, lives with daughter and son in law x 4 years after moving here from [facility], [facility], former , , loved reading, start [employer], puzzles, golfing, and dancing     PALLIATIVE DIAGNOSES:   1. Hypoalbuminemia   2. Dysphagia  3. Feeding difficulties  4. Stroke  5. Physical debility      PLAN:   1. Met with the patient's daughter and son in law  2. Introduced our services and purpose of consultation  3. Discussed the patient's condition, high risk of decline, pt wishes, poor rehab ability, artificial nutrition, disposition, hospice care, questions, concerns  4. Decisions: family will elect hospice if the pt fails to improve. They would like to give the pt another 24-48 hours  5. Hospice consult placed given pt unlikely to improve  6. Skill rehab if the pt rallies  7. Family feels a peg would be against the patient's wishes  8. Disposition  9. Daughter plans to care for the pt at home with some hired assitance  10. Will continue to follow  11. Initial consult note routed to primary continuity provider  12.  Communicated plan of care with: Palliative IDT       GOALS OF CARE / TREATMENT PREFERENCES:     GOALS OF CARE:  Patient/Health Care Proxy Stated Goals: Other (comment)      TREATMENT PREFERENCES:   Code Status: DNR    Advance Care Planning:  Advance Care Planning 11/1/2018   Confirm Advance Directive Yes, on file       Medical Interventions: Other (comment)   Other Instructions: Other:    As far as possible, the palliative care team has discussed with patient / health care proxy about goals of care / treatment preferences for patient. HISTORY:     History obtained from: family, chart, team    CHIEF COMPLAINT: pt admitted with acute stroke    HPI/SUBJECTIVE:    The patient is:   [] Verbal and participatory  [x] Non-participatory due to:   Due to medical condition      Clinical Pain Assessment (nonverbal scale for severity on nonverbal patients): Activity (Movement): Seeking attention through movement or slow, cautious movement    Duration: for how long has pt been experiencing pain (e.g., 2 days, 1 month, years)  Frequency: how often pain is an issue (e.g., several times per day, once every few days, constant)     FUNCTIONAL ASSESSMENT:     Palliative Performance Scale (PPS):  PPS: 30       PSYCHOSOCIAL/SPIRITUAL SCREENING:     Palliative IDT has assessed this patient for cultural preferences / practices and a referral made as appropriate to needs (Cultural Services, Patient Advocacy, Ethics, etc.)    Advance Care Planning:  Advance Care Planning 11/1/2018   Confirm Advance Directive Yes, on file       Any spiritual / Gnosticism concerns:  [] Yes /  [x] No    Caregiver Burnout:  [] Yes /  [x] No /  [] No Caregiver Present      Anticipatory grief assessment:   [x] Normal  / [] Maladaptive       ESAS Anxiety:      ESAS Depression:          REVIEW OF SYSTEMS:     Positive and pertinent negative findings in ROS are noted above in HPI. The following systems were [] reviewed / [x] unable to be reviewed as noted in HPI  Other findings are noted below.   Systems: constitutional, ears/nose/mouth/throat, respiratory, gastrointestinal, genitourinary, musculoskeletal, integumentary, neurologic, psychiatric, endocrine. Positive findings noted below. Modified ESAS Completed by: provider                                            PHYSICAL EXAM:     From RN flowsheet:  Wt Readings from Last 3 Encounters:   11/02/18 117 lb 11.6 oz (53.4 kg)   01/14/15 130 lb (59 kg)   12/25/14 129 lb 13.6 oz (58.9 kg)     Blood pressure 155/81, pulse 77, temperature (!) 100.5 °F (38.1 °C), resp. rate 20, height 4' 11\" (1.499 m), weight 117 lb 11.6 oz (53.4 kg), SpO2 91 %. Pain Scale 1: Visual  Pain Intensity 1: 0                 Last bowel movement, if known:     Constitutional: ill appearing, non verbal  Eyes: pupils equal, anicteric  ENMT: no nasal discharge, moist mucous membranes  Cardiovascular: regular rhythm, distal pulses intact  Respiratory: breathing not labored, symmetric  Gastrointestinal: soft non-tender, +bowel sounds  Musculoskeletal: no deformity, no tenderness to palpation  Skin: warm, dry  Neurologic: acute stroke  Psychiatric: unable to assess   Other:       HISTORY:     Principal Problem:    Altered mental state (10/31/2018)      Past Medical History:   Diagnosis Date    CAD (coronary artery disease)     Chronic kidney disease     stent in artery of right kidney    Endocrine disease     High cholesterol     Hypertension     Hypothyroid     Stroke ([facility] [address]     tia      Past Surgical History:   Procedure Laterality Date    CARDIAC SURG PROCEDURE UNLIST      cardiac stent      Family History   Problem Relation Age of Onset    Heart Disease Mother     Stroke Mother     Heart Disease Father     Heart Disease Brother       History reviewed, no pertinent family history.   Social History     Tobacco Use    Smoking status: Never Smoker   Substance Use Topics    Alcohol use: Yes     Comment: occasional     Allergies   Allergen Reactions    Sulfa (Sulfonamide Antibiotics) Unknown (comments)      Current Facility-Administered Medications   Medication Dose Route Frequency    aspirin chewable tablet 162 mg  162 mg Oral BID    sodium chloride (NS) flush 5-10 mL  5-10 mL IntraVENous Q8H    sodium chloride (NS) flush 5-10 mL  5-10 mL IntraVENous PRN    acetaminophen (TYLENOL) tablet 650 mg  650 mg Oral Q4H PRN    Or    acetaminophen (TYLENOL) solution 650 mg  650 mg Per NG tube Q4H PRN    Or    acetaminophen (TYLENOL) suppository 650 mg  650 mg Rectal Q4H PRN    ondansetron (ZOFRAN) injection 4 mg  4 mg IntraVENous Q6H PRN    clopidogrel (PLAVIX) tablet 75 mg  75 mg Oral DAILY    labetalol (NORMODYNE) tablet 200 mg  200 mg Oral BID    losartan (COZAAR) tablet 50 mg  50 mg Oral QHS    famotidine (PEPCID) tablet 20 mg  20 mg Oral DAILY    therapeutic multivitamin (THERAGRAN) tablet 1 Tab  1 Tab Oral DAILY    calcium-vitamin D (OS-DEX) 500 mg-200 unit tablet  1 Tab Oral DAILY    lactobac ac& pc-s.therm-b.anim (ALY Q/RISAQUAD)  1 Cap Oral DAILY    levothyroxine (SYNTHROID) tablet 75 mcg  75 mcg Oral ACB    pravastatin (PRAVACHOL) tablet 20 mg  20 mg Oral QHS    furosemide (LASIX) tablet 40 mg  40 mg Oral DAILY          LAB AND IMAGING FINDINGS:     Lab Results   Component Value Date/Time    WBC 13.9 (H) 11/02/2018 02:57 AM    HGB 11.5 11/02/2018 02:57 AM    PLATELET 884 21/31/4876 02:57 AM     Lab Results   Component Value Date/Time    Sodium 136 11/02/2018 02:57 AM    Potassium 3.6 11/02/2018 02:57 AM    Chloride 108 11/02/2018 02:57 AM    CO2 23 11/02/2018 02:57 AM    BUN 13 11/02/2018 02:57 AM    Creatinine 0.92 11/02/2018 02:57 AM    Calcium 8.6 11/02/2018 02:57 AM    Magnesium 1.9 12/23/2014 03:36 AM    Phosphorus 3.4 12/23/2014 03:36 AM      Lab Results   Component Value Date/Time    AST (SGOT) 18 10/31/2018 02:59 PM    Alk.  phosphatase 60 10/31/2018 02:59 PM    Protein, total 5.8 (L) 10/31/2018 02:59 PM    Albumin 2.8 (L) 10/31/2018 02:59 PM    Globulin 3.0 10/31/2018 02:59 PM     Lab Results   Component Value Date/Time INR 1.1 10/31/2018 02:59 PM    Prothrombin time 11.2 (H) 10/31/2018 02:59 PM    aPTT 27.6 10/31/2018 02:59 PM      No results found for: IRON, FE, TIBC, IBCT, PSAT, FERR   No results found for: PH, PCO2, PO2  No components found for: [place]   Lab Results   Component Value Date/Time    CK 5,680 (H) 07/28/2014 03:40 AM    CK - MB 7.5 (H) 07/27/2014 05:39 AM                Total time: 70 minutes  Counseling / coordination time, spent as noted above: 55 minutes  > 50% counseling / coordination?: y    Prolonged service was provided for  []30 min   []75 min in face to face time in the presence of the patient, spent as noted above. Time Start:   Time End:   Note: this can only be billed with 30321 (initial) or 68735 (follow up). If multiple start / stop times, list each separately.

## 2018-11-01 NOTE — PROGRESS NOTES
Problem: Dysphagia (Adult) Goal: *Acute Goals and Plan of Care (Insert Text) Speech therapy goals Initiated 11/1/2018 1. Patient will tolerate puree/thin liquid diet without s/s of aspiration within 7 days 2. Patient will tolerate solid trials with timely and complete mastication and no s/s of aspiration to determine safety for diet upgrade within 7 days Speech LAnguage Pathology bedside swallow evaluation Patient: Lennox Parry (9 y.o. female) Date: 11/1/2018 Primary Diagnosis: Altered mental state Precautions: dysphagia ASSESSMENT : 
Based on the objective data described below, the patient presents with mild oral dysphagia and suspected intact pharyngeal swallow that is further impacted by poor mental status. Patient demonstrates reduced awareness of PO with impaired bolus acceptance. Patient required max cues to place lips around straw/spoon to extract PO with reduced awareness of straw/spoon. Following acceptance of PO, patient held bolus in oral cavity for several seconds prior to initiating a swallow. Pharyngeal phase characterized by suspected timely swallow initiation and functional hyolaryngeal elevation/excursion via palpation. No overt s/s of aspiration observed across consistencies. Solids not assessed due to patient's decreased attention, reduced command following, reduced recognition of PO items, and confusion. dtr present reports need for frequent cueing to chew/swallow foods with breakfast.   
 
Patient will benefit from skilled intervention to address the above impairments. Patients rehabilitation potential is considered to be Guarded Factors which may influence rehabilitation potential include:  
[]            None noted [x]            Mental ability/status [x]            Medical condition []            Home/family situation and support systems [x]            Safety awareness 
[]            Pain tolerance/management []            Other: PLAN : 
 Recommendations and Planned Interventions: 
--puree diet/thin liquids, meds as tolerated - suspect will need in applesauce (whole vs crushed) as dtr reports increasing difficulty taking pills with water PTA. Verbal cues to assist with recognition of PO items. --slp to follow for diet tolerance and upgrade as mental status allows Frequency/Duration: Patient will be followed by speech-language pathology 3 times a week to address goals. Discharge Recommendations: To Be Determined SUBJECTIVE:  
Patient alert and confused with decreased command following/attention. Patient demonstrated reduced recognition of PO and required frequent/max cueing to accept PO. 
 
OBJECTIVE:  
 
Past Medical History:  
Diagnosis Date  CAD (coronary artery disease)  Chronic kidney disease   
 stent in artery of right kidney  Endocrine disease  High cholesterol  Hypertension  Hypothyroid  Stroke ([facility] [address]   
 tia Past Surgical History:  
Procedure Laterality Date  CARDIAC SURG PROCEDURE UNLIST    
 cardiac stent Prior Level of Function/Home Situation:  
Home Situation Home Environment: Private residence One/Two Story Residence: Two story Living Alone: No 
Support Systems: Child(arvind) Patient Expects to be Discharged to[de-identified] Other (comment)(palliative) Current DME Used/Available at Home: Cane, straight, Safety frame toliet, Shower chair(transport chair) Diet prior to admission: regular Current Diet:  regular Cognitive and Communication Status: 
Neurologic State: Alert, Confused Orientation Level: Disoriented X4 Cognition: Decreased attention/concentration, Decreased command following Perception: Appears intact Perseveration: No perseveration noted Safety/Judgement: Not assessed Oral Assessment: 
Oral Assessment Labial: Other (comment)(not able to assess due to no command following) Dentition: Natural 
Oral Hygiene: moist mucosa Lingual: Other (comment)(not able to assess due to no command following) Velum: Unable to visualize Mandible: No impairment P.O. Trials: 
Patient Position: upright in bed Vocal quality prior to P.O.: No impairment Consistency Presented: Ice chips; Thin liquid;Puree How Presented: SLP-fed/presented;Straw;Spoon Bolus Acceptance: Impaired Bolus Formation/Control: Impaired Type of Impairment: Incomplete;Lip closure; Other (comment)(oral holding of PO) Propulsion: No impairment Oral Residue: None Initiation of Swallow: No impairment Laryngeal Elevation: Functional 
Aspiration Signs/Symptoms: None Pharyngeal Phase Characteristics: Double swallow; Suspected pharyngeal residue; Other (comment)(with puree trials) ? residue vs. Piecemeal propulsion? Effective Modifications: None Cues for Modifications: Maximal(to accept PO and swallow) Oral Phase Severity: Mild(suspected related to medical history of dementia) Pharyngeal Phase Severity : No impairment G Codes: In compliance with CMSs Claims Based Outcome Reporting, the following G-code set was chosen for this patient based the use of the NOMS functional outcome to quantify this patient's level of swallowing impairment. Using the NOMS, the patient was determined to be at level 3 for swallow function which correlates with the CL= 60-79% level of severity. Based on the objective assessment provided within this note, the current, goal, and discharge g-codes are as follows: 
 
Swallow  Swallowing: 
 Swallow Current Status CL= 60-79%  Swallow Goal Status CK= 40-59% NOMS Swallowing Levels: 
Level 1 (CN): NPO Level 2 (CM): NPO but takes consistency in therapy Level 3 (CL): Takes less than 50% of nutrition p.o. and continues with nonoral feedings; and/or safe with mod cues; and/or max diet restriction Level 4 (CK): Safe swallow but needs mod cues; and/or mod diet restriction; and/or still requires some nonoral feeding/supplements Level 5 (CJ): Safe swallow with min diet restriction; and/or needs min cues Level 6 (CI): Independent with p.o.; rare cues; usually self cues; may need to avoid some foods or needs extra time Level 7 (CH): Independent for all p.o. ADALID. (2003). National Outcomes Measurement System (NOMS): Adult Speech-Language Pathology User's Guide. Pain: 
Pain Scale 1: Numeric (0 - 10) Pain Intensity 1: 0 After treatment:  
[]            Patient left in no apparent distress sitting up in chair 
[x]            Patient left in no apparent distress in bed 
[x]            Call bell left within reach [x]            Nursing notified 
[x]            Caregiver present 
[]            Bed alarm activated COMMUNICATION/EDUCATION:  
The patients plan of care including recommendations, planned interventions, and recommended diet changes were discussed with: Registered Nurse. [x]            Patient/family have participated as able in goal setting and plan of care. [x]            Patient/family agree to work toward stated goals and plan of care. []            Patient understands intent and goals of therapy, but is neutral about his/her participation. []            Patient is unable to participate in goal setting and plan of care. Thank you for this referral. 
Yoav Toribio Student, SLP Time Calculation: 15 mins Regarding student involvement in patient care: A student participated in this treatment session. Per CMS Medicare statements and  guidelines I certify that the following was true: 1. I was present and directly observed the entire session. 2. I made all skilled judgments and clinical decisions regarding care. 3. I am the practitioner responsible for assessment, treatment, and documentation.

## 2018-11-01 NOTE — PROGRESS NOTES
Bedside shift change report given to Cb Sheffield RN (oncoming nurse) by Suzanne Park RN (offgoing nurse).  Report included the following information SBAR, Kardex, Intake/Output, MAR, Accordion, Recent Results and Cardiac Rhythm SB.

## 2018-11-01 NOTE — PROGRESS NOTES
Problem: Mobility Impaired (Adult and Pediatric) Goal: *Acute Goals and Plan of Care (Insert Text) Physical Therapy Goals Initiated 11/1/2018 1. Patient will move from supine to sit and sit to supine  in bed with minimal assist within 7 day(s). 2.  Patient will transfer from bed to chair and chair to bed with moderate assistance  using the least restrictive device within 7 day(s). 3.  Patient will perform sit to stand with minimal assistance/contact guard assist within 7 day(s). 4.  Patient will ambulate with minimal assistance/contact guard assist for 50 feet with the least restrictive device within 7 day(s). 5.  Patient will ascend/descend 5 stairs with 1 handrail(s) with minimal assistance/contact guard assist within 7 day(s). physical Therapy EVALUATION Patient: Cameron Lisa (55 y.o. female) Date: 11/1/2018 Primary Diagnosis: Altered mental state Precautions:     
 
ASSESSMENT : 
Based on the objective data described below, the patient presents with poor command following, initiation, balance, and was unable to transition beyond standing at bedside following admission for AMS. Prior to admission this patient was ambulatory within the home using a cane when she remembered and completing self-care with assistance from her daughter. This patient has a hx of dementia but was mobile in the home and was able to climb a flight of stairs to access her bedroom. Her daughter was present for all hx gathering today as the patient was unable to participate. She answered \"okay\" to all questions and was oriented x0. Pending improvement in command following, anticipate discharge to a SNF rehab setting when medically stable. Patient will benefit from skilled intervention to address the above impairments. Patients rehabilitation potential is considered to be Good Factors which may influence rehabilitation potential include:  
[x]         None noted 
[]         Mental ability/status []         Medical condition 
[]         Home/family situation and support systems 
[]         Safety awareness 
[]         Pain tolerance/management 
[]         Other: PLAN : 
Recommendations and Planned Interventions: 
[x]           Bed Mobility Training             [x]    Neuromuscular Re-Education 
[x]           Transfer Training                   []    Orthotic/Prosthetic Training 
[x]           Gait Training                         []    Modalities [x]           Therapeutic Exercises           []    Edema Management/Control 
[x]           Therapeutic Activities            []    Patient and Family Training/Education 
[]           Other (comment): Frequency/Duration: Patient will be followed by physical therapy  3 times a week to address goals. Discharge Recommendations: Rehab Further Equipment Recommendations for Discharge: to be determined SUBJECTIVE:  
Patient stated Okay.  OBJECTIVE DATA SUMMARY:  
HISTORY:   
Past Medical History:  
Diagnosis Date  CAD (coronary artery disease)  Chronic kidney disease   
 stent in artery of right kidney  Endocrine disease  High cholesterol  Hypertension  Hypothyroid  Stroke ([facility] [address]   
 tia Past Surgical History:  
Procedure Laterality Date  CARDIAC SURG PROCEDURE UNLIST    
 cardiac stent Prior Level of Function/Home Situation: modified independent with cane Personal factors and/or comorbidities impacting plan of care: dementia Home Situation Home Environment: Private residence One/Two Story Residence: Two story Living Alone: No 
Support Systems: Child(arvind) Patient Expects to be Discharged to[de-identified] Other (comment)(palliative) Current DME Used/Available at Home: Cane, straight, Safety frame toliet, Shower chair(transport chair)EXAMINATION/PRESENTATION/DECISION MAKING: Critical Behavior: 
Neurologic State: Eyes open to stimulus, Eyes open to pain, Eyes open spontaneously, Eyes open to voice Orientation Level: Disoriented X4 Cognition: Decreased command following, Impaired decision making, Decreased attention/concentration Hearing: Auditory Auditory Impairment: Hard of hearing, bilateral, Hearing aid(s) Hearing Aids/Status: At homeSkin:   
Edema:  
Range Of Motion: 
AROM: Generally decreased, functional 
  
  
  
  
  
  
  
Strength:   
Strength: Generally decreased, functional(as observed, unable to MMT) Tone & Sensation:  
Tone: Normal 
  
  
  
  
Sensation: Intact Coordination: 
Coordination: Within functional limits Functional Mobility: 
Bed Mobility: 
Rolling: Moderate assistance Supine to Sit: Maximum assistance Transfers: 
Sit to Stand: Moderate assistance Stand to Sit: Minimum assistance Balance:  
Sitting: Impaired Sitting - Static: Fair (occasional) Sitting - Dynamic: Fair (occasional) Standing: Impaired Standing - Static: Poor(retropulsion) Standing - Dynamic : PoorAmbulation/Gait Training:Distance (ft): 3 Feet (ft) Assistive Device: (bilateral HHA) Ambulation - Level of Assistance: Moderate assistance Gait Description (WDL): Exceptions to [facility] Gait Abnormalities: Decreased step clearance Base of Support: Widened Speed/Jie: Slow;Shuffled Therapeutic Exercises:  
 
 
Functional Measure: 
Tinetti test: 
 
Sitting Balance: 1 Arises: 0 Attempts to Rise: 0 Immediate Standing Balance: 0 Standing Balance: 0 Nudged: 0 Eyes Closed: 0 Turn 360 Degrees - Continuous/Discontinuous: 0 Turn 360 Degrees - Steady/Unsteady: 0 Sitting Down: 0 Balance Score: 1 Indication of Gait: 0 
R Step Length/Height: 0 
L Step Length/Height: 0 
R Foot Clearance: 0 
L Foot Clearance: 0 Step Symmetry: 0 Step Continuity: 0 Path: 0 Trunk: 0 Walking Time: 0 Gait Score: 0 Total Score: 1 Tinetti Test and G-code impairment scale: 
Percentage of Impairment CH 
 
0% 
 CI 
 
1-19% CJ 
 
20-39% CK 
 
 40-59% CL 
 
60-79% CM 
 
80-99% CN  
 
100% Tinetti Score 0-28 28 23-27 17-22 12-16 6-11 1-5 0 Tinetti Tool Score Risk of Falls 
<19 = High Fall Risk 19-24 = Moderate Fall Risk 25-28 = Low Fall Risk Tinetti ME. Performance-Oriented Assessment of Mobility Problems in Elderly Patients. [facility]; [id number]. (Scoring Description: PT Bulletin Feb. 10, 1993) Older adults: Berenice Baca et al, 2009; n = [address] elderly evaluated with ABC, SOTO, ADL, and IADL) · Mean SOTO score for males aged 69-68 years = 26.21(3.40) · Mean SOTO score for females age 69-68 years = 25.16(4.30) · Mean SOTO score for males over 80 years = 23.29(6.02) · Mean SOTO score for females over 80 years = 17.20(8.32) G codes: In compliance with CMSs Claims Based Outcome Reporting, the following G-code set was chosen for this patient based on their primary functional limitation being treated: The outcome measure chosen to determine the severity of the functional limitation was the Tinetti with a score of 1/28 which was correlated with the impairment scale. ? Mobility - Walking and Moving Around:  
  - CURRENT STATUS: CM - 80%-99% impaired, limited or restricted  - GOAL STATUS: CL - 60%-79% impaired, limited or restricted  - D/C STATUS:  ---------------To be determined--------------- Physical Therapy Evaluation Charge Determination History Examination Presentation Decision-Making MEDIUM  Complexity : 1-2 comorbidities / personal factors will impact the outcome/ POC  MEDIUM Complexity : 3 Standardized tests and measures addressing body structure, function, activity limitation and / or participation in recreation  LOW Complexity : Stable, uncomplicated  LOW Complexity : FOTO score of  Based on the above components, the patient evaluation is determined to be of the following complexity level: LOW Pain: 
Pain Scale 1: Numeric (0 - 10) Pain Intensity 1: 0 
  
  
  
  
 Activity Tolerance:  
 
Please refer to the flowsheet for vital signs taken during this treatment. After treatment:  
[]         Patient left in no apparent distress sitting up in chair 
[x]         Patient left in no apparent distress in bed 
[x]         Call bell left within reach [x]         Nursing notified 
[]         Caregiver present 
[]         Bed alarm activated COMMUNICATION/EDUCATION:  
The patients plan of care was discussed with: Registered Nurse. [x]         Fall prevention education was provided and the patient/caregiver indicated understanding. [x]         Patient/family have participated as able in goal setting and plan of care. [x]         Patient/family agree to work toward stated goals and plan of care. []         Patient understands intent and goals of therapy, but is neutral about his/her participation. []         Patient is unable to participate in goal setting and plan of care. Thank you for this referral. 
Sujey Clifford, PT, DPT Time Calculation: 27 mins

## 2018-11-01 NOTE — PROGRESS NOTES
Problem: Falls - Risk of 
Goal: *Absence of Falls Document Brian Bell Fall Risk and appropriate interventions in the flowsheet. Outcome: Progressing Towards Goal 
Fall Risk Interventions: 
Mobility Interventions: Bed/chair exit alarm, PT Consult for mobility concerns, Utilize walker, cane, or other assistive device, Utilize gait belt for transfers/ambulation Mentation Interventions: Bed/chair exit alarm, Increase mobility, More frequent rounding, Reorient patient, Room close to nurse's station Medication Interventions: Bed/chair exit alarm Elimination Interventions: Call light in reach, Bed/chair exit alarm, Toileting schedule/hourly rounds

## 2018-11-01 NOTE — PROGRESS NOTES
Reason for Admission: The patient presented with altered mental status RRAT Score:  5 Plan for utilizing home health:  TBD- CM will await PT/OT evaluations- currently recommending SNF Likelihood of Readmission:  Low Transition of Care Plan:  TBD- PT currently recommending SNF The CM met with patient, daughter and son-in-law at bedside. Patient responded yes and politely greeted patient, however, information for assessment obtained by daughter. The patient's daughter is on file as MPOA as well as Durable Power-of-. The patient lives with her daughter and son-in-law in their home. The family is very supportive and involved- at baseline the patient was able to ambulate with cane throughout the house, and family endorses patient was able to climb upstairs with supervision/minimal assistance. The patient's daughter endorses that she provides assistance with dressing and bathing due to cognitive statues. The patient has a cane, family also has shower chair and grab bars in the bathroom. The patient was able to feed self prior to hospitalization. The family endorses someone is usually always with the patient- only step out for walks in the neighborhood or brief grocery shopping trip- the family states they have cameras and baby monitors throughout the home for their brief steps out. Family endorses that when they are gone for long periods of time they have caregivers that come in to assist. The family verified demographics and insurance information. The CM discussed PT current recommendations of rehab, family verbalized agreement and understanding. CM provided list of SNFs at bedside- family would like referral to be sent to [facility] Yen called and sent referral via [employer]. [facility] signed and placed on chart. The CM will continue to follow as discharge needs arise.  FATOUMATA Ponce 
 
 Care Management Interventions PCP Verified by CM: Yes(Patient's family verified PCP as Dr. Denis Camara ) Mode of Transport at Discharge: BLS Transition of Care Consult (CM Consult): Discharge Planning MyChart Signup: Yes Discharge Durable Medical Equipment: No 
Health Maintenance Reviewed: Yes Physical Therapy Consult: Yes Occupational Therapy Consult: Yes Speech Therapy Consult: Yes Current Support Network: Aye, Lives with Caregiver(Patient lives with her daughter and son-in-law in their home) Confirm Follow Up Transport: Other (see comment)(Anticipate BLS) Plan discussed with Pt/Family/Caregiver: Yes [employer] Information Provided?: No 
Discharge Location Discharge Placement: Skilled nursing facility(PT currently recommending SNF)

## 2018-11-01 NOTE — ROUTINE PROCESS
Bedside and Verbal shift change report given to TERESA Will (oncoming nurse) by Sona Howell (offgoing nurse). Report included the following information SBAR, Kardex, ED Summary, Recent Results and Cardiac Rhythm SR-Sinus arrythmia.

## 2018-11-01 NOTE — CONSULTS
[address]    Toby Carrillo  MR#: [id number]  : 1926  ACCOUNT #: [de-identified]   DATE OF SERVICE: 2018    REQUESTING PHYSICIAN:  Isabella Elmore MD    REASON FOR EVALUATION:  Stroke. HISTORY OF PRESENT ILLNESS:  The patient is a 80-year-old female with history of hypertension, previous multiple TIAs, coronary artery disease who was in her baseline state of health at around 10 o'clock the night before admission and when she woke up next morning the daughter noticed that she was sitting in the chair, staring off to the left side and was very weak all over. She was brought into the hospital and was admitted for further workup. She was noted to have right-sided weakness and difficulty understanding/expressing herself and a stroke was suspected. At baseline, she is dependent for most activities of daily living related to dementia. She is currently living with her daughter. Detailed review of systems was difficult, but she denies any headache, nausea, vomiting, chest pain, shortness of breath or palpitations. PAST MEDICAL HISTORY:  As mentioned above. REVIEW OF SYSTEMS:  A 10-point review of systems was negative except as mentioned in the HPI. SOCIAL HISTORY:  No history of smoking. Drinks a glass of wine almost on a daily basis. Lives with her family. ALLERGIES:  SULFA DRUGS. HOME MEDICATIONS:  Aspirin 325 mg daily, Plavix 75 mg daily, pravastatin 10 mg at bedtime, Biotene, labetalol, Synthroid, Zantac, Exelon patch. PHYSICAL EXAMINATION:  GENERAL:  The patient is lying in bed in no acute distress. VITAL SIGNS:  Blood pressure 144/81, temperature is 98.6, pulse is 62. She has a gaze preference to the left side. NEUROLOGICAL:  Pupils are equal and reactive. There appears to be a visual field cut on the right.   She is having difficulty following commands and her speech at times is incomprehensible and her responses are not appropriate to the question. She does not raise arms up in the air to verbal command, but when passively helped she was able to hold them. Trace weakness on the right when compared to the left. She was able to raise her left leg against gravity without difficulty, but does not make any effort to move her right leg. DTRs 2/2 and symmetric. Toes upgoing on the right. Sensory, cerebellar, gait exam was difficult. CARDIOVASCULAR:  Regular rate and rhythm. CHEST:  Clear. ABDOMEN:  Soft, nontender, positive bowel sounds. EXTREMITIES:  No edema. LABORATORY DATA:  CBC with WBC 12.4, hemoglobin 12.0, hematocrit 37.6, platelets 678. Chemistries are unremarkable. Lipid profile:  Triglycerides 56, cholesterol 187, HDL is 80, LDL is 95.8. Hemoglobin A1c is 5.9. CTA of the head and neck did not show any acute large vessel arterial occlusion. There is moderate to severe intracranial atherosclerosis with severe stenosis seen in the proximal posterior division of the M2 segment of the left middle cerebral artery. CT brain did not show any acute abnormalities, but there is a suspicion of a new infarct at the junction of right rubina and midbrain; however, this infarct was age indeterminate. Echocardiogram is pending. ASSESSMENT AND PLAN:  A 79-year-old female with history of baseline dementia who is dependent for most activities of daily living. She is now admitted with gaze preference to the left, expressive and receptive aphasia and right lower more than upper extremity weakness. She has likely had a new left middle cerebral artery distribution infarct related to intracranial atherosclerosis. This occurred despite being on dual antiplatelet therapy. She is not a candidate for any vascular intervention and treatment and essentially is supportive. I agree with palliative evaluation and possibly transitioning to hospice given significant deficits related to stroke superimposed on baseline dementia.       Neil Comment, MD SLOAN / CLIVE  D: 11/01/2018 14:45     T: 11/01/2018 16:27  JOB #: [id number]

## 2018-11-01 NOTE — PROGRESS NOTES
Problem: Self Care Deficits Care Plan (Adult) Goal: *Acute Goals and Plan of Care (Insert Text) Occupational Therapy Goals Initiated 11/1/2018 1. Patient will perform self-feeding with minimal assistance/contact guard assist within 7 day(s). 2.  Patient will perform one grooming with minimal assistance/contact guard assist within 7 day(s). 3.  Patient will perform upper body dressing with moderate assistance  within 7 day(s). 4.  Patient will perform functional tasks in unsupported sitting with minimal assistance within 7 day(s). 5.  Patient will perform self care transfer with mod A within 7 days. Occupational Therapy EVALUATION Patient: Pantera Coleman (24 y.o. female) Date: 11/1/2018 Primary Diagnosis: Altered mental state Precautions: Fall, chair/bed alarm ASSESSMENT : 
Based on the objective data described below, the patient presents with impaired activity tolerance, cognition, balance and mobility necessary in ADL tasks with admission for AMS. Patient oriented x 0. Hx of dementia. Daughter present and providing PLOF. Dtr reprots patient is usually oriented to self and able to carry on basic conservation, impaired STM. Patient lives with dtr and provides assist with ADL tasks at varying levels, ambulated at home with supervision. 24 hour supervision. Patient able to follow command to squeeze therapist hands and hold wash cloth. Unable to follow commands to bring wash cloth to face to wash face. Unable to complete vision assessment. Initially restless, when repositioned higher in bed patient calmer. At this time she is total A for ADL tasks which is below baseline. Daughter hopeful for SNF to maximize indep with plans to bring patient home. Will continue to follow for OT in acute care to maximize indep. Patient will benefit from skilled intervention to address the above impairments. Patients rehabilitation potential is considered to be Fair Factors which may influence rehabilitation potential include:  
[]             None noted [x]             Mental ability/status [x]             Medical condition []             Home/family situation and support systems []             Safety awareness []             Pain tolerance/management 
[]             Other: PLAN : 
Recommendations and Planned Interventions: 
[x]               Self Care Training                  []        Therapeutic Activities [x]               Functional Mobility Training    []        Cognitive Retraining 
[x]               Therapeutic Exercises           [x]        Endurance Activities [x]               Balance Training                   [x]        Neuromuscular Re-Education []               Visual/Perceptual Training     [x]   Home Safety Training 
[x]               Patient Education                 [x]        Family Training/Education []               Other (comment): Frequency/Duration: Patient will be followed by occupational therapy 3 times a week to address goals. Discharge Recommendations: Isaac Oakes Further Equipment Recommendations for Discharge: SNF SUBJECTIVE:  
Patient stated I want to leave.  OBJECTIVE DATA SUMMARY:  
HISTORY:  
Past Medical History:  
Diagnosis Date  CAD (coronary artery disease)  Chronic kidney disease   
 stent in artery of right kidney  Endocrine disease  High cholesterol  Hypertension  Hypothyroid  Stroke ([address]   
 tia Past Surgical History:  
Procedure Laterality Date  CARDIAC SURG PROCEDURE UNLIST    
 cardiac stent Prior Level of Function/Environment/Context: Home with dtr. Patient able to participate with grooming, UB care and amb with verbal and tactile cues. Dtr provides 24 hour supervision.   
Occupations in which the patient is/was successful, what are the barriers preventing that success:  
Performance Patterns (routines, roles, habits, and rituals):  
 Personal Interests and/or values:  
Expanded or extensive additional review of patient history:  
 
Home Situation Home Environment: Private residence One/Two Story Residence: Two story Living Alone: No 
Support Systems: Child(arvind) Patient Expects to be Discharged to[de-identified] Other (comment)(palliative) Current DME Used/Available at Home: Cane, straight, Safety frame toliet, Shower chair(transport chair) Tub or Shower Type: Tub/Shower combination Hand dominance: RightEXAMINATION OF PERFORMANCE DEFICITS: 
Cognitive/Behavioral Status: 
Neurologic State: Alert;Confused Orientation Level: Disoriented X4 Cognition: Decreased attention/concentration;Decreased command following Perception: Appears intact Perseveration: No perseveration noted Safety/Judgement: Not assessed Hearing: Auditory Auditory Impairment: Hard of hearing, bilateral, Hearing aid(s) Hearing Aids/Status: At home Vision/Perceptual:   
Unable to assess secondary to confusion Range of Motion: 
AROM: Generally decreased, functional 
  
  
  
  
  
  
  
Strength: 
Strength: Generally decreased, functional(as observed, unable to MMT) Coordination: 
Coordination: Within functional limits Fine Motor Skills-Upper: Left Intact; Right Intact(poor command follow) Gross Motor Skills-Upper: Left Intact; Right Intact(poor command follow) Tone & Sensation: 
Tone: Normal 
Sensation: Intact Balance: 
Sitting: Impaired Sitting - Static: Fair (occasional) Sitting - Dynamic: Fair (occasional) Standing: Impaired Standing - Static: Poor(retropulsion) Standing - Dynamic : Poor Functional Mobility and Transfers for ADLs:Bed Mobility: 
Rolling: Moderate assistance Supine to Sit: Maximum assistance Transfers: 
Sit to Stand: Moderate assistance Stand to Sit: Minimum assistance ADL Assessment: 
Feeding: Total assistance Oral Facial Hygiene/Grooming: Total assistance Bathing: Total assistance Upper Body Dressing: Total assistance Lower Body Dressing: Total assistance Toileting: Total assistance ADL Intervention and task modifications: 
  
Education regarding grooming tasks at bed level. Patient able to follow one step command to  therapist's hands with BUE. Then able to hold wash cloth. Poor particiation with washing face. Encouraged with hand over hand, no carry over. Patient restless. Repositioned in bed with total A x2. Cognitive Retraining Safety/Judgement: Not assessed Functional Measure: 
Barthel Index: 
 
Bathin Bladder: 0 Bowels: 0 Groomin Dressin Feedin Mobility: 0 Stairs: 0 Toilet Use: 0 Transfer (Bed to Chair and Back): 5 Total: 5 Barthel and G-code impairment scale: 
Percentage of impairment CH 
0% CI 
1-19% CJ 
20-39% CK 
40-59% CL 
60-79% CM 
80-99% CN 
100% Barthel Score 0-100 100 99-80 79-60 59-40 20-39 1-19 
 0 Barthel Score 0-20 20 17-19 13-16 9-12 5-8 1-4 0 The Barthel ADL Index: Guidelines 1. The index should be used as a record of what a patient does, not as a record of what a patient could do. 2. The main aim is to establish degree of independence from any help, physical or verbal, however minor and for whatever reason. 3. The need for supervision renders the patient not independent. 4. A patient's performance should be established using the best available evidence. Asking the patient, friends/relatives and nurses are the usual sources, but direct observation and common sense are also important. However direct testing is not needed. 5. Usually the patient's performance over the preceding 24-48 hours is important, but occasionally longer periods will be relevant. 6. Middle categories imply that the patient supplies over 50 per cent of the effort. 7. Use of aids to be independent is allowed. Jed Taylor., Barthel, D.W. ([id number]). Functional evaluation: the Barthel Index. [address] [facility] (14)2. SHIRLEY Redmond, Katlyn Armendariz., [facility]. [place], [address] (1999). Measuring the change indisability after inpatient rehabilitation; comparison of the responsiveness of the Barthel Index and Functional [place] Measure. Journal of Neurology, Neurosurgery, and Psychiatry, 66(4), 847-347. LANIE Olivas, CATHERINE Aden, & Terell Hinojosa M.A. (2004.) Assessment of post-stroke quality of life in cost-effectiveness studies: The usefulness of the Barthel Index and the EuroQoL-5D. [facility], 13, 191-13 G codes: In compliance with CMSs Claims Based Outcome Reporting, the following G-code set was chosen for this patient based on their primary functional limitation being treated: The outcome measure chosen to determine the severity of the functional limitation was the Bartehl index with a score of 5/100 which was correlated with the impairment scale. ? Self Care:  
  - CURRENT STATUS: CM - 80%-99% impaired, limited or restricted  - GOAL STATUS: CL - 60%-79% impaired, limited or restricted  - D/C STATUS:  ---------------To be determined--------------- Occupational Therapy Evaluation Charge Determination History Examination Decision-Making HIGH Complexity : Extensive review of history including physical, cognitive and psychosocial history  HIGH Complexity : 5 or more performance deficits relating to physical, cognitive , or psychosocial skils that result in activity limitations and / or participation restrictions HIGH Complexity : Patient presents with comorbidities that affect occupational performance. Signifigant modification of tasks or assistance (eg, physical or verbal) with assessment (s) is necessary to enable patient to complete evaluation Based on the above components, the patient evaluation is determined to be of the following complexity level: HIGH Pain: 
Pain Scale 1: Numeric (0 - 10) Pain Intensity 1: 0 
  
 Activity Tolerance:  
Bed level- poor-  BP at rest stable. After treatment:  
[] Patient left in no apparent distress sitting up in chair 
[x] Patient left in no apparent distress in bed 
[x] Call bell left within reach [x] Nursing notified 
[x] Caregiver present [x] Bed alarm activated COMMUNICATION/EDUCATION:  
The patients plan of care was discussed with: Physical Therapist and Registered Nurse. 
[] Home safety education was provided and the patient/caregiver indicated understanding. [] Patient/family have participated as able in goal setting and plan of care. [] Patient/family agree to work toward stated goals and plan of care. [] Patient understands intent and goals of therapy, but is neutral about his/her participation. [x] Patient is unable to participate in goal setting and plan of care. This patients plan of care is appropriate for delegation to [facility]. Thank you for this referral. 
Julee Benitez OT Time Calculation: 25 mins

## 2018-11-01 NOTE — PROGRESS NOTES
Hospitalist Progress Note Gala Adams MD 
Answering service: [phone] OR 36 from in house phone Cell: [phone] Date of Service:  2018 NAME:  Obed Segura :  1926 MRN:  [id number] Admission Summary:  
Briefly, this is a 70-year-old  female with a past medical history of hypertension, multiple TIAs, coronary artery disease. She comes over here because of changes in mental status that was noted around 10:00 to 10:30 a.m. this morning. According to the daughter, the patient was quite at her baseline yesterday at around 10 p.m. when she had her regular meal, and had a shower, and then she had gone up to bed. Overnight, the patient must have woken up once and had used the restroom at her baseline, but this morning between 10:00 to 10:30 when the daughter went in the patient's room to check on her, the patient was sitting on her chair and had significant slurred speech, was not comprehending as usual, and had gaze on her left side. The daughter mentioned that this has happened in the past quite frequently, but the patient usually comes back to her baseline pretty quickly. This time, even after waiting for a couple of hours, the patient did not come back to her usual self; that is when the daughter brought the patient into the ER. No chest pain, shortness of breath, headache, dizziness, cough, fever. No diarrhea. No other changes in bowel movements. Interval history / Subjective:  
  F/u changes in mental status Assessment & Plan:  
 
AMS in a patient with history of TIA 
-likely sec to TIA/Stroke 
-CT head 10/31 Possible new infarct at the junction of the right rubina and midbrain, compared with the  study. However, age indeterminate 
-CTA 10/31 No acute large vessel arterial occlusion.  Moderate to severe intracranial atherosclerosis with severe critical stenosis in the proximal posterior division M2 segment left middle cerebral artery 
-Appreciate Neurology 
-The patient still not safe enough to swallow. Looks worse today 
-On ASA/Plavix but not safe enough to have them CAD 
-continue home meds CKD stage III 
-stable Hypothyroidism 
-on synthroid, continue Cardiac diet PT/OT SNF Code status: DNR. Appreciate palliative care DVT prophylaxis: scd PTA: home with daughter Plan: In light of the patient worsening mental status, spoke to the family and we decided to make the patient comfort care. The family will meet with hospice today. I think then the patient should be a candidate for inpatient hospice. The daughter agrees. The son-in-law in the room agrees as well. Care Plan discussed with: Patient/Family Disposition: TBD Hospital Problems  Date Reviewed: 10/31/2018 Codes Class Noted POA * (Principal) Altered mental state ICD-10-CM: R41.82 
ICD-9-CM: 780.97  10/31/2018 Yes Review of Systems: A comprehensive review of systems was negative except for that written in the HPI. Vital Signs:  
 Last 24hrs VS reviewed since prior progress note. Most recent are: 
Visit Vitals /81 (BP 1 Location: Right arm, BP Patient Position: At rest) Pulse 62 Temp 98.6 °F (37 °C) Resp 15 Ht 4' 11\" (1.499 m) Wt 56.5 kg (124 lb 9 oz) SpO2 95% BMI 25.16 kg/m² Intake/Output Summary (Last 24 hours) at 11/1/2018 1413 Last data filed at 11/1/2018 3837 Gross per 24 hour Intake 3088.75 ml Output 400 ml Net 2688.75 ml Physical Examination:  
 
 
     
Constitutional:  No acute distress, drowsy but arousable ENT:  Oral mucous moist, oropharynx benign. Neck supple, Resp:  CTA bilaterally. No wheezing/rhonchi/rales. No accessory muscle use CV:  Regular rhythm, normal rate, no murmurs, gallops, rubs GI:  Soft, non distended, non tender. normoactive bowel sounds, no hepatosplenomegaly Musculoskeletal:  No edema, warm, 2+ pulses throughout Neurologic:  not moving her extremities specially RUE, RLE Skin: no rashes, no ulcers Data Review:  
 Review and/or order of clinical lab test 
 
 
Labs:  
 
Recent Labs 11/01/18 
0336 10/31/18 
1459 WBC 12.4* 7.7 HGB 12.0 10.1* HCT 37.6 32.0*  
 166 Recent Labs 10/31/18 
1459   
K 4.0  
 CO2 29 BUN 18 CREA 0.94 * CA 8.1* Recent Labs 10/31/18 
1459 SGOT 18 ALT 14 AP 60 TBILI 0.3 TP 5.8* ALB 2.8*  
GLOB 3.0 Recent Labs 10/31/18 
1459 INR 1.1 PTP 11.2* APTT 27.6 No results for input(s): FE, TIBC, PSAT, FERR in the last 72 hours. Lab Results Component Value Date/Time Folate 30.6 (H) 12/23/2014 03:36 AM  
  
No results for input(s): PH, PCO2, PO2 in the last 72 hours. Recent Labs 10/31/18 
1459 TROIQ <0.05 Lab Results Component Value Date/Time Cholesterol, total 187 11/01/2018 03:36 AM  
 HDL Cholesterol 80 11/01/2018 03:36 AM  
 LDL, calculated 95.8 11/01/2018 03:36 AM  
 Triglyceride 56 11/01/2018 03:36 AM  
 CHOL/HDL Ratio 2.3 11/01/2018 03:36 AM  
 
Lab Results Component Value Date/Time Glucose (POC) 105 12/26/2014 05:01 PM  
 Glucose (POC) 162 (H) 12/26/2014 12:21 PM  
 Glucose (POC) 136 (H) 12/26/2014 08:08 AM  
 Glucose (POC) 92 12/25/2014 10:01 PM  
 Glucose (POC) 97 12/23/2014 09:50 PM  
 
Lab Results Component Value Date/Time  Color YELLOW/STRAW 10/31/2018 02:59 PM  
 Appearance CLEAR 10/31/2018 02:59 PM  
 Specific gravity 1.026 10/31/2018 02:59 PM  
 Specific gravity 1.025 12/22/2014 05:20 PM  
 pH (UA) 6.5 10/31/2018 02:59 PM  
 Protein NEGATIVE  10/31/2018 02:59 PM  
 Glucose NEGATIVE  10/31/2018 02:59 PM  
 Ketone NEGATIVE  10/31/2018 02:59 PM  
 Bilirubin NEGATIVE  10/31/2018 02:59 PM  
 Urobilinogen 0.2 10/31/2018 02:59 PM  
 Nitrites NEGATIVE  10/31/2018 02:59 PM  
 Leukocyte Esterase TRACE (A) 10/31/2018 02:59 PM  
 Epithelial cells FEW 10/31/2018 02:59 PM  
 Bacteria NEGATIVE  10/31/2018 02:59 PM  
 WBC 0-4 10/31/2018 02:59 PM  
 RBC 0-5 10/31/2018 02:59 PM  
 
 
 
Medications Reviewed:  
 
Current Facility-Administered Medications Medication Dose Route Frequency  sodium chloride (NS) flush 5-10 mL  5-10 mL IntraVENous Q8H  
 sodium chloride (NS) flush 5-10 mL  5-10 mL IntraVENous PRN  
 acetaminophen (TYLENOL) tablet 650 mg  650 mg Oral Q4H PRN Or  
 acetaminophen (TYLENOL) solution 650 mg  650 mg Per NG tube Q4H PRN Or  
 acetaminophen (TYLENOL) suppository 650 mg  650 mg Rectal Q4H PRN  
 0.9% sodium chloride infusion  75 mL/hr IntraVENous CONTINUOUS  
 ondansetron (ZOFRAN) injection 4 mg  4 mg IntraVENous Q6H PRN  
 clopidogrel (PLAVIX) tablet 75 mg  75 mg Oral DAILY  labetalol (NORMODYNE) tablet 200 mg  200 mg Oral BID  losartan (COZAAR) tablet 50 mg  50 mg Oral QHS  famotidine (PEPCID) tablet 20 mg  20 mg Oral DAILY  aspirin delayed-release tablet 162 mg  162 mg Oral BID  therapeutic multivitamin (THERAGRAN) tablet 1 Tab  1 Tab Oral DAILY  calcium-vitamin D (OS-DEX) 500 mg-200 unit tablet  1 Tab Oral DAILY  lactobac ac& pc-s.therm-b.anim (ALY Q/RISAQUAD)  1 Cap Oral DAILY  levothyroxine (SYNTHROID) tablet 75 mcg  75 mcg Oral ACB  pravastatin (PRAVACHOL) tablet 20 mg  20 mg Oral QHS  furosemide (LASIX) tablet 40 mg  40 mg Oral DAILY  
 
______________________________________________________________________ EXPECTED LENGTH OF STAY: 2d 9h 
ACTUAL LENGTH OF STAY:          1 Cintia Spear MD

## 2018-11-02 LAB
ANION GAP SERPL CALC-SCNC: 5 MMOL/L (ref 5–15)
BASOPHILS # BLD: 0.1 K/UL (ref 0–0.1)
BASOPHILS NFR BLD: 1 % (ref 0–1)
BUN SERPL-MCNC: 13 MG/DL (ref 6–20)
BUN/CREAT SERPL: 14 (ref 12–20)
CALCIUM SERPL-MCNC: 8.6 MG/DL (ref 8.5–10.1)
CHLORIDE SERPL-SCNC: 108 MMOL/L (ref 97–108)
CO2 SERPL-SCNC: 23 MMOL/L (ref 21–32)
CREAT SERPL-MCNC: 0.92 MG/DL (ref 0.55–1.02)
DIFFERENTIAL METHOD BLD: ABNORMAL
EOSINOPHIL # BLD: 0.3 K/UL (ref 0–0.4)
EOSINOPHIL NFR BLD: 2 % (ref 0–7)
ERYTHROCYTE [DISTWIDTH] IN BLOOD BY AUTOMATED COUNT: 13.5 % (ref 11.5–14.5)
GLUCOSE SERPL-MCNC: 105 MG/DL (ref 65–100)
HCT VFR BLD AUTO: 35.2 % (ref 35–47)
HGB BLD-MCNC: 11.5 G/DL (ref 11.5–16)
IMM GRANULOCYTES # BLD: 0.1 K/UL (ref 0–0.04)
IMM GRANULOCYTES NFR BLD AUTO: 1 % (ref 0–0.5)
LYMPHOCYTES # BLD: 1 K/UL (ref 0.8–3.5)
LYMPHOCYTES NFR BLD: 7 % (ref 12–49)
MCH RBC QN AUTO: 32.7 PG (ref 26–34)
MCHC RBC AUTO-ENTMCNC: 32.7 G/DL (ref 30–36.5)
MCV RBC AUTO: 100 FL (ref 80–99)
MONOCYTES # BLD: 1.6 K/UL (ref 0–1)
MONOCYTES NFR BLD: 11 % (ref 5–13)
NEUTS SEG # BLD: 10.9 K/UL (ref 1.8–8)
NEUTS SEG NFR BLD: 79 % (ref 32–75)
NRBC # BLD: 0 K/UL (ref 0–0.01)
NRBC BLD-RTO: 0 PER 100 WBC
PLATELET # BLD AUTO: 177 K/UL (ref 150–400)
PMV BLD AUTO: 12.5 FL (ref 8.9–12.9)
POTASSIUM SERPL-SCNC: 3.6 MMOL/L (ref 3.5–5.1)
RBC # BLD AUTO: 3.52 M/UL (ref 3.8–5.2)
SODIUM SERPL-SCNC: 136 MMOL/L (ref 136–145)
WBC # BLD AUTO: 13.9 K/UL (ref 3.6–11)

## 2018-11-02 PROCEDURE — 92526 ORAL FUNCTION THERAPY: CPT | Performed by: SPEECH-LANGUAGE PATHOLOGIST

## 2018-11-02 PROCEDURE — 36415 COLL VENOUS BLD VENIPUNCTURE: CPT | Performed by: HOSPITALIST

## 2018-11-02 PROCEDURE — 80048 BASIC METABOLIC PNL TOTAL CA: CPT | Performed by: HOSPITALIST

## 2018-11-02 PROCEDURE — 74011250637 HC RX REV CODE- 250/637: Performed by: HOSPITALIST

## 2018-11-02 PROCEDURE — 65660000000 HC RM CCU STEPDOWN

## 2018-11-02 PROCEDURE — 85025 COMPLETE CBC W/AUTO DIFF WBC: CPT | Performed by: HOSPITALIST

## 2018-11-02 RX ORDER — ACETAMINOPHEN 650 MG/1
650 SUPPOSITORY RECTAL
Status: DISCONTINUED | OUTPATIENT
Start: 2018-11-02 | End: 2018-11-02 | Stop reason: SDUPTHER

## 2018-11-02 RX ORDER — ACETAMINOPHEN 325 MG/1
650 TABLET ORAL
Status: DISCONTINUED | OUTPATIENT
Start: 2018-11-02 | End: 2018-11-02 | Stop reason: SDUPTHER

## 2018-11-02 RX ORDER — LORAZEPAM 2 MG/ML
1 CONCENTRATE ORAL
Status: DISCONTINUED | OUTPATIENT
Start: 2018-11-02 | End: 2018-11-03 | Stop reason: HOSPADM

## 2018-11-02 RX ADMIN — LEVOTHYROXINE SODIUM 75 MCG: 75 TABLET ORAL at 07:15

## 2018-11-02 RX ADMIN — Medication 10 ML: at 07:16

## 2018-11-02 RX ADMIN — CALCIUM CARBONATE-VITAMIN D TAB 500 MG-200 UNIT 1 TABLET: 500-200 TAB at 09:04

## 2018-11-02 RX ADMIN — FUROSEMIDE 40 MG: 40 TABLET ORAL at 09:06

## 2018-11-02 RX ADMIN — ACETAMINOPHEN 650 MG: 325 TABLET ORAL at 07:28

## 2018-11-02 RX ADMIN — CLOPIDOGREL BISULFATE 75 MG: 75 TABLET ORAL at 09:05

## 2018-11-02 RX ADMIN — Medication 10 ML: at 15:00

## 2018-11-02 RX ADMIN — THERA TABS 1 TABLET: TAB at 09:05

## 2018-11-02 RX ADMIN — ASPIRIN 81 MG CHEWABLE TABLET 162 MG: 81 TABLET CHEWABLE at 09:04

## 2018-11-02 RX ADMIN — LABETALOL HYDROCHLORIDE 200 MG: 100 TABLET, FILM COATED ORAL at 09:05

## 2018-11-02 RX ADMIN — FAMOTIDINE 20 MG: 20 TABLET ORAL at 09:05

## 2018-11-02 RX ADMIN — Medication 1 CAPSULE: at 09:04

## 2018-11-02 NOTE — ROUTINE PROCESS
Bedside and Verbal shift change report given to Rajeev steward (oncoming nurse) by Ara Quijano (offgoing nurse).  Report included the following information SBAR, Kardex, Recent Results and Cardiac Rhythm SR.

## 2018-11-02 NOTE — PROGRESS NOTES
CM met with patient and daughter/MPOA at bedside- CM noted hospice consultation. CM discussed with family and patient's daughter would like referral to be sent to [facility] (patient has used their home health services in the past and happy with services). CM sent referral in [employer] to [facility]. Family would like informational meeting- also stated they want to see how patient does with therapy today. FATOUMATA Garcia 
 
8:41 a.m.- RICHAR called and spoke with Sahra Mcginnis with [facility] ([phone])- stated agency will plan on meeting with the family today. FATOUMATA Garcia CM received call from [place] with [facility]- stated that one of their hospital liaisons plan to come meet with family at 11:30 a.m. Today. FATOUMATA Garcia 
 
11:32 a.m.- RICHAR spoke with MD who stated he wonders if patient will meet inpatient hospice criteria- anticipate patient may not meet criteria, will await to hear from hospice. CM spoke with family and explained if patient does qualify for inpatient hospice it would have to be [facility] hospice- family stated they want to hold referral to [facility] until they meet with [facility]. CM updated liaison with [facility]. FATOUMATA Garcia 
 
13:33 p.m.- CM met with patient and daughter/MPOA at bedside- family met with [facility] and they plan to bring the patient home with hospice services- hoping today. RICHAR paged attending MD.  
 
Melissa Dugan called Kaela with [facility] and they can accept patient on hospice services today. Family plans to bring patient home on hospice. CM sent referral to [facility]- awaiting to hear what time they can accommodate and CM will update [facility]. Patient is scheduled for 3:30 p.m.  CM called and updated Kaela with At [facility] and agreeable, stated that they can accept patient at that time.  FATOUMATA Garcia 
 
 14:08 p.m.- CM met with family- concern is the hospice equipment (hospital bed, etc). CM called  [address]- patient's equipment will be delivered this evening- family cannot bring patient home until equipment is in place. The plan is for patient to discharge tomorrow at 12:00 p.m. With At [address]- equipment will be delivered tonight 11/2, and RN will be out tomorrow with At [address] at 2:00 p.m. FATOUMATA Dong 
 
[facility] confirmed transport for tomorrow for 12:00 p.m.- updated Arjun Smalls with At [address] Family is aware and agreeable of plan.

## 2018-11-02 NOTE — PROGRESS NOTES
Problem: Falls - Risk of 
Goal: *Absence of Falls Document Pradeep Coppola Fall Risk and appropriate interventions in the flowsheet. Outcome: Progressing Towards Goal 
Fall Risk Interventions: 
Mobility Interventions: Patient to call before getting OOB, PT Consult for mobility concerns, PT Consult for assist device competence, Strengthening exercises (ROM-active/passive), Utilize gait belt for transfers/ambulation, Utilize walker, cane, or other assistive device, Communicate number of staff needed for ambulation/transfer Mentation Interventions: Adequate sleep, hydration, pain control, Door open when patient unattended, Family/sitter at bedside, More frequent rounding, Reorient patient, Toileting rounds, Update white board Medication Interventions: Patient to call before getting OOB, Teach patient to arise slowly Elimination Interventions: Patient to call for help with toileting needs, Toileting schedule/hourly rounds, Call light in reach

## 2018-11-02 NOTE — PROGRESS NOTES
Bedside and Verbal shift change report given to [address] [place] [address] 107 (oncoming nurse) by Deric Boone RN (offgoing nurse). Report included the following information SBAR, Kardex, Intake/Output, MAR, Recent Results and Cardiac Rhythm NSR.

## 2018-11-02 NOTE — PROGRESS NOTES
Problem: Dysphagia (Adult) Goal: *Acute Goals and Plan of Care (Insert Text) Speech therapy goals Initiated 11/1/2018 1. Patient will tolerate puree/thin liquid diet without s/s of aspiration within 7 days 2. Patient will tolerate solid trials with timely and complete mastication and no s/s of aspiration to determine safety for diet upgrade within 7 days Speech language pathology dysphagia treatment Patient: Edmundo Bashir (1 y.o. female) Date: 11/2/2018 Diagnosis: Altered mental state Altered mental state Precautions:   dysphagia ASSESSMENT: 
Patient with decline in swallow function, now with severe oropharyngeal dysphagia. Patient asleep upon entry into room and opened eyes with verbal/tactile stimulus. Patient demonstrates no command following, no verbalization, and decreased attention. Ice chips x1 trial attempted with oral holding, impaired bolus control, suspected premature spillage, and absent swallow initiation. Patient made no attempts at swallowing bolus, despite tongue base massage and max cues to swallow bolus. PO trials discontinued due to reduced mental status with no recognition of PO items. Per dtr and nsg, patient with oral stasis or complete spillage from mouth when PO and meds offered this morning. Patient at increased risk of aspiration due to decline in mental status, absent recognition of PO items, and variable level of alertness. Progression toward goals: 
[]         Improving appropriately and progressing toward goals [x]         Improving slowly and progressing toward goals 
[]         Not making progress toward goals and plan of care will be adjusted PLAN: 
Recommendations and Planned Interventions: 
-- recommend NPO with oral swabs dipped in favorite flavors for comfort. Per dtr, plans to meet with hospice today with likely discharge home with hospice.   At this point, does not seem that PO intake provides comfort as patient with no attempts to swallow or recognize items. Suspect oral swabs would provide more comfort than PO offerings even upon transition to hospice. However, could consider allowing sips/bites if patient is actively accepting and swallowing and showing signs of comfort with feeds. dtr educated at length regarding aspiration risks and signs of comfort/discomfort with PO. She had excellent understanding 
--will follow up next week pending goals of care if she remains hospitalized. Patient continues to benefit from skilled intervention to address the above impairments. Continue treatment per established plan of care. Discharge Recommendations: To Be Determined SUBJECTIVE:  
Patient confused with no command following or attempts at verbalization. Decline in overall status today per discussion with dtr and nsg. OBJECTIVE:  
Cognitive and Communication Status: 
Neurologic State: Confused, Eyes open to voice Orientation Level: Disoriented X4 Cognition: Decreased command following, Decreased attention/concentration Perception: Appears intact Perseveration: No perseveration noted Safety/Judgement: Not assessed Dysphagia Treatment: 
Oral Assessment: 
Oral Assessment Labial: Other (comment)(unable to assess due to no command following) Dentition: Natural 
Oral Hygiene: moist mucosa Lingual: Other (comment)(unable to assess due to no command following) Velum: Unable to visualize Mandible: No impairment P.O. Trials: 
Patient Position: upright in bed Vocal quality prior to P.O.: (no verbalizations) Consistency Presented: Ice chips How Presented: SLP-fed/presented;Spoon Bolus Acceptance: No impairment Bolus Formation/Control: Impaired Type of Impairment: Delayed;Mastication;Premature spillage Propulsion: Delayed (# of seconds) Oral Residue: None Initiation of Swallow: Absent(tongue base massage attempted ) Laryngeal Elevation: Absent(due to absent swallow initiation) Aspiration Signs/Symptoms: None Pharyngeal Phase Characteristics: Poor endurance Effective Modifications: None Cues for Modifications: Maximal 
  
Oral Phase Severity: Severe Pharyngeal Phase Severity : Severe After treatment:  
[]              Patient left in no apparent distress sitting up in chair 
[x]              Patient left in no apparent distress in bed 
[x]              Call bell left within reach [x]              Nursing notified 
[x]              Caregiver present 
[]              Bed alarm activated COMMUNICATION/EDUCATION:  
Patient was educated regarding Her deficit(s) of dyphagia as this relates to Her diagnosis of CVA. She demonstrated Guarded understanding as evidenced by her mental status. The patients plan of care including recommendations, planned interventions, and recommended diet changes were discussed with: Registered Nurse. Karsten Candelaria SLP Time Calculation: 20 mins Regarding student involvement in patient care: A student participated in this treatment session. Per CMS Medicare statements and  guidelines I certify that the following was true: 1. I was present and directly observed the entire session. 2. I made all skilled judgments and clinical decisions regarding care. 3. I am the practitioner responsible for assessment, treatment, and documentation.

## 2018-11-02 NOTE — CONSULTS
Palliative Medicine Consult  [place]: [phone] ([phone])    Patient Name: Judi Knutson  YOB: 1926    Date of Initial Consult: November 1, 2018  Reason for Consult: Care Decisions  Requesting Provider: Dr. Melvi Kidd   Primary Care Physician: Mera Lanza MD     SUMMARY:   Judi Knutson is a 80 y.o. with a past history of CAD, HTN, multiple TIA\"s, CKD, hypothyroidism, hypercholesteremia. who was admitted on 10/31/2018 from home  with a diagnosis of L MCA infarct related to intracranial atherosclerosis despite dual antiplatelet therapy. Current medical issues leading to Palliative Medicine involvement include: support with care decisions given recurrent TIA's now stroke. Hospice appropriate. Social: , 2 children, lives with daughter and son in law x 4 years after moving here from [facility], native [facility], former , , loved reading, start [employer], puzzles, golfing, and dancing     PALLIATIVE DIAGNOSES:   1. Hypoalbuminemia   2. Dysphagia  3. Feeding difficulties  4. Stroke  5. Physical debility      PLAN:   1. Met with the patient's daughter   2. Pt now full comfort measures with plans to transition home with hospice tomorrow. 3. daughter feels well supported and verbalized no concerns  4. Pt without any symptom burden  5. Will sign off    6. Initial consult note routed to primary continuity provider  7. Communicated plan of care with: Palliative IDT       GOALS OF CARE / TREATMENT PREFERENCES:     GOALS OF CARE:  Patient/Health Care Proxy Stated Goals: Other (comment)      TREATMENT PREFERENCES:   Code Status: DNR    Advance Care Planning:  Advance Care Planning 11/1/2018   Confirm Advance Directive Yes, on file       Medical Interventions: Other (comment)   Other Instructions:          Other:    As far as possible, the palliative care team has discussed with patient / health care proxy about goals of care / treatment preferences for patient. HISTORY:     History obtained from: family, chart, team    CHIEF COMPLAINT: pt admitted with acute stroke    HPI/SUBJECTIVE:    The patient is:   [] Verbal and participatory  [x] Non-participatory due to:   Due to medical condition      Clinical Pain Assessment (nonverbal scale for severity on nonverbal patients): Activity (Movement): Lying quietly, normal position    Duration: for how long has pt been experiencing pain (e.g., 2 days, 1 month, years)  Frequency: how often pain is an issue (e.g., several times per day, once every few days, constant)     FUNCTIONAL ASSESSMENT:     Palliative Performance Scale (PPS):  PPS: 30       PSYCHOSOCIAL/SPIRITUAL SCREENING:     Palliative IDT has assessed this patient for cultural preferences / practices and a referral made as appropriate to needs (Cultural Services, Patient Advocacy, Ethics, etc.)    Advance Care Planning:  Advance Care Planning 11/1/2018   Confirm Advance Directive Yes, on file       Any spiritual / Spiritism concerns:  [] Yes /  [x] No    Caregiver Burnout:  [] Yes /  [x] No /  [] No Caregiver Present      Anticipatory grief assessment:   [x] Normal  / [] Maladaptive       ESAS Anxiety:      ESAS Depression:          REVIEW OF SYSTEMS:     Positive and pertinent negative findings in ROS are noted above in HPI. The following systems were [] reviewed / [x] unable to be reviewed as noted in HPI  Other findings are noted below. Systems: constitutional, ears/nose/mouth/throat, respiratory, gastrointestinal, genitourinary, musculoskeletal, integumentary, neurologic, psychiatric, endocrine. Positive findings noted below.   Modified ESAS Completed by: provider                                            PHYSICAL EXAM:     From RN flowsheet:  Wt Readings from Last 3 Encounters:   11/02/18 117 lb 11.6 oz (53.4 kg)   01/14/15 130 lb (59 kg)   12/25/14 129 lb 13.6 oz (58.9 kg)     Blood pressure (!) 157/93, pulse 76, temperature 99.1 °F (37.3 °C), resp. rate 16, height 4' 11\" (1.499 m), weight 117 lb 11.6 oz (53.4 kg), SpO2 98 %. Pain Scale 1: Adult Nonverbal Pain Scale  Pain Intensity 1: 0                 Last bowel movement, if known:     Constitutional: ill appearing, non verbal  Eyes: pupils equal, anicteric  ENMT: no nasal discharge, moist mucous membranes  Cardiovascular: regular rhythm, distal pulses intact  Respiratory: breathing not labored, symmetric  Gastrointestinal: soft non-tender, +bowel sounds  Musculoskeletal: no deformity, no tenderness to palpation  Skin: warm, dry  Neurologic: acute stroke  Psychiatric: unable to assess   Other:       HISTORY:     Principal Problem:    Altered mental state (10/31/2018)      Past Medical History:   Diagnosis Date    CAD (coronary artery disease)     Chronic kidney disease     stent in artery of right kidney    Endocrine disease     High cholesterol     Hypertension     Hypothyroid     Stroke ([facility] [address]     tia      Past Surgical History:   Procedure Laterality Date    CARDIAC SURG PROCEDURE UNLIST      cardiac stent      Family History   Problem Relation Age of Onset    Heart Disease Mother     Stroke Mother     Heart Disease Father     Heart Disease Brother       History reviewed, no pertinent family history.   Social History     Tobacco Use    Smoking status: Never Smoker   Substance Use Topics    Alcohol use: Yes     Comment: occasional     Allergies   Allergen Reactions    Sulfa (Sulfonamide Antibiotics) Unknown (comments)      Current Facility-Administered Medications   Medication Dose Route Frequency    LORazepam (INTENSOL) 2 mg/mL oral concentrate 1 mg  1 mg Oral Q1H PRN    acetaminophen (TYLENOL) tablet 650 mg  650 mg Oral Q4H PRN    Or    acetaminophen (TYLENOL) solution 650 mg  650 mg Oral Q4H PRN    Or    acetaminophen (TYLENOL) suppository 650 mg  650 mg Rectal Q4H PRN    aspirin chewable tablet 162 mg  162 mg Oral BID    sodium chloride (NS) flush 5-10 mL  5-10 mL IntraVENous Q8H    sodium chloride (NS) flush 5-10 mL  5-10 mL IntraVENous PRN    acetaminophen (TYLENOL) tablet 650 mg  650 mg Oral Q4H PRN    Or    acetaminophen (TYLENOL) solution 650 mg  650 mg Per NG tube Q4H PRN    Or    acetaminophen (TYLENOL) suppository 650 mg  650 mg Rectal Q4H PRN    ondansetron (ZOFRAN) injection 4 mg  4 mg IntraVENous Q6H PRN    clopidogrel (PLAVIX) tablet 75 mg  75 mg Oral DAILY    labetalol (NORMODYNE) tablet 200 mg  200 mg Oral BID    losartan (COZAAR) tablet 50 mg  50 mg Oral QHS    famotidine (PEPCID) tablet 20 mg  20 mg Oral DAILY    therapeutic multivitamin (THERAGRAN) tablet 1 Tab  1 Tab Oral DAILY    calcium-vitamin D (OS-DEX) 500 mg-200 unit tablet  1 Tab Oral DAILY    lactobac ac& pc-s.therm-b.anim (ALY Q/RISAQUAD)  1 Cap Oral DAILY    levothyroxine (SYNTHROID) tablet 75 mcg  75 mcg Oral ACB    pravastatin (PRAVACHOL) tablet 20 mg  20 mg Oral QHS    furosemide (LASIX) tablet 40 mg  40 mg Oral DAILY          LAB AND IMAGING FINDINGS:     Lab Results   Component Value Date/Time    WBC 13.9 (H) 11/02/2018 02:57 AM    HGB 11.5 11/02/2018 02:57 AM    PLATELET 602 80/96/2979 02:57 AM     Lab Results   Component Value Date/Time    Sodium 136 11/02/2018 02:57 AM    Potassium 3.6 11/02/2018 02:57 AM    Chloride 108 11/02/2018 02:57 AM    CO2 23 11/02/2018 02:57 AM    BUN 13 11/02/2018 02:57 AM    Creatinine 0.92 11/02/2018 02:57 AM    Calcium 8.6 11/02/2018 02:57 AM    Magnesium 1.9 12/23/2014 03:36 AM    Phosphorus 3.4 12/23/2014 03:36 AM      Lab Results   Component Value Date/Time    AST (SGOT) 18 10/31/2018 02:59 PM    Alk.  phosphatase 60 10/31/2018 02:59 PM    Protein, total 5.8 (L) 10/31/2018 02:59 PM    Albumin 2.8 (L) 10/31/2018 02:59 PM    Globulin 3.0 10/31/2018 02:59 PM     Lab Results   Component Value Date/Time    INR 1.1 10/31/2018 02:59 PM    Prothrombin time 11.2 (H) 10/31/2018 02:59 PM    aPTT 27.6 10/31/2018 02:59 PM      No results found for: IRON, FE, TIBC, IBCT, PSAT, FERR   No results found for: PH, PCO2, PO2  No components found for: [place]   Lab Results   Component Value Date/Time    CK 5,680 (H) 07/28/2014 03:40 AM    CK - MB 7.5 (H) 07/27/2014 05:39 AM                Total time: 25 minutes  Counseling / coordination time, spent as noted above: 20 minutes  > 50% counseling / coordination?: y    Prolonged service was provided for  []30 min   []75 min in face to face time in the presence of the patient, spent as noted above. Time Start:   Time End:   Note: this can only be billed with 09395 (initial) or 01715 (follow up). If multiple start / stop times, list each separately.

## 2018-11-02 NOTE — PROGRESS NOTES
Physical Therapy Note: 
Spoke with Dr. Yaneth Skaggs after meeting with family. He asked to defer in light of worsening cognition and family has decided to transition to hospice. Will sign off. Please re-consult if we can be of assist. 
Thank you, Chip Dorsey, PT, DPT

## 2018-11-02 NOTE — PROGRESS NOTES
Hospitalist Progress Note Richy Mejia MD 
Answering service: [phone] from in house phone Cell: [phone] Date of Service:  2018 NAME:  Navdeep Whaley :  1926 MRN:  [id number] Admission Summary:  
Briefly, this is a 80-year-old  female with a past medical history of hypertension, multiple TIAs, coronary artery disease. She comes over here because of changes in mental status that was noted around 10:00 to 10:30 a.m. this morning. According to the daughter, the patient was quite at her baseline yesterday at around 10 p.m. when she had her regular meal, and had a shower, and then she had gone up to bed. Overnight, the patient must have woken up once and had used the restroom at her baseline, but this morning between 10:00 to 10:30 when the daughter went in the patient's room to check on her, the patient was sitting on her chair and had significant slurred speech, was not comprehending as usual, and had gaze on her left side. The daughter mentioned that this has happened in the past quite frequently, but the patient usually comes back to her baseline pretty quickly. This time, even after waiting for a couple of hours, the patient did not come back to her usual self; that is when the daughter brought the patient into the ER. No chest pain, shortness of breath, headache, dizziness, cough, fever. No diarrhea. No other changes in bowel movements. Interval history / Subjective:  
  F/u changes in mental status Fever overnight Assessment & Plan:  
 
AMS in a patient with history of TIA 
-likely sec to TIA/Stroke 
-CT head 10/31 Possible new infarct at the junction of the right rubina and midbrain, compared with the  study. However, age indeterminate 
-CTA 10/31 No acute large vessel arterial occlusion.  Moderate to severe intracranial atherosclerosis with severe critical stenosis in the proximal posterior division M2 segment left middle cerebral artery 
-Appreciate Neurology 
-The patient still not safe enough to swallow. Looks worse today 
-On ASA/Plavix but not safe enough to have them Fever overnight 
-symptomatic treatment, will not do a work up. The family agrees CAD 
-continue home meds CKD stage III 
-stable Hypothyroidism 
-on synthroid, continue Cardiac diet PT/OT SNF Code status: DNR. Appreciate palliative care DVT prophylaxis: scd PTA: home with daughter Plan: In light of the patient worsening mental status, spoke to the family and we decided to make the patient comfort care. The family will meet with hospice today. I think then the patient should be a candidate for inpatient hospice. The daughter agrees. The son-in-law in the room agrees as well. Care Plan discussed with: Patient/Family Disposition: TBD Hospital Problems  Date Reviewed: 10/31/2018 Codes Class Noted POA * (Principal) Altered mental state ICD-10-CM: R41.82 
ICD-9-CM: 780.97  10/31/2018 Yes Review of Systems: A comprehensive review of systems was negative except for that written in the HPI. Vital Signs:  
 Last 24hrs VS reviewed since prior progress note. Most recent are: 
Visit Vitals BP (!) 157/93 (BP 1 Location: Right arm, BP Patient Position: At rest) Pulse 76 Temp 99.1 °F (37.3 °C) Resp 16 Ht 4' 11\" (1.499 m) Wt 53.4 kg (117 lb 11.6 oz) SpO2 98% BMI 23.78 kg/m² No intake or output data in the 24 hours ending 11/02/18 1214 Physical Examination:  
 
 
     
Constitutional:  No acute distress, drowsy but arousable ENT:  Oral mucous moist, oropharynx benign. Neck supple, Resp:  CTA bilaterally. No wheezing/rhonchi/rales. No accessory muscle use CV:  Regular rhythm, normal rate, no murmurs, gallops, rubs GI:  Soft, non distended, non tender. normoactive bowel sounds, no hepatosplenomegaly Musculoskeletal:  No edema, warm, 2+ pulses throughout Neurologic:  not moving her extremities specially RUE, RLE Skin: no rashes, no ulcers Data Review:  
 Review and/or order of clinical lab test 
 
 
Labs:  
 
Recent Labs 11/02/18 
0257 11/01/18 
7717 WBC 13.9* 12.4* HGB 11.5 12.0 HCT 35.2 37.6  190 Recent Labs 11/02/18 
0257 10/31/18 
1459  138  
K 3.6 4.0  
 101 CO2 23 29 BUN 13 18 CREA 0.92 0.94 * 104* CA 8.6 8.1* Recent Labs 10/31/18 
1459 SGOT 18 ALT 14 AP 60 TBILI 0.3 TP 5.8* ALB 2.8*  
GLOB 3.0 Recent Labs 10/31/18 
1459 INR 1.1 PTP 11.2* APTT 27.6 No results for input(s): FE, TIBC, PSAT, FERR in the last 72 hours. Lab Results Component Value Date/Time Folate 30.6 (H) 12/23/2014 03:36 AM  
  
No results for input(s): PH, PCO2, PO2 in the last 72 hours. Recent Labs 10/31/18 
1459 TROIQ <0.05 Lab Results Component Value Date/Time Cholesterol, total 187 11/01/2018 03:36 AM  
 HDL Cholesterol 80 11/01/2018 03:36 AM  
 LDL, calculated 95.8 11/01/2018 03:36 AM  
 Triglyceride 56 11/01/2018 03:36 AM  
 CHOL/HDL Ratio 2.3 11/01/2018 03:36 AM  
 
Lab Results Component Value Date/Time Glucose (POC) 105 12/26/2014 05:01 PM  
 Glucose (POC) 162 (H) 12/26/2014 12:21 PM  
 Glucose (POC) 136 (H) 12/26/2014 08:08 AM  
 Glucose (POC) 92 12/25/2014 10:01 PM  
 Glucose (POC) 97 12/23/2014 09:50 PM  
 
Lab Results Component Value Date/Time  Color YELLOW/STRAW 10/31/2018 02:59 PM  
 Appearance CLEAR 10/31/2018 02:59 PM  
 Specific gravity 1.026 10/31/2018 02:59 PM  
 Specific gravity 1.025 12/22/2014 05:20 PM  
 pH (UA) 6.5 10/31/2018 02:59 PM  
 Protein NEGATIVE  10/31/2018 02:59 PM  
 Glucose NEGATIVE  10/31/2018 02:59 PM  
 Ketone NEGATIVE  10/31/2018 02:59 PM  
 Bilirubin NEGATIVE  10/31/2018 02:59 PM  
 Urobilinogen 0.2 10/31/2018 02:59 PM  
 Nitrites NEGATIVE  10/31/2018 02:59 PM  
 Leukocyte Esterase TRACE (A) 10/31/2018 02:59 PM  
 Epithelial cells FEW 10/31/2018 02:59 PM  
 Bacteria NEGATIVE  10/31/2018 02:59 PM  
 WBC 0-4 10/31/2018 02:59 PM  
 RBC 0-5 10/31/2018 02:59 PM  
 
 
 
Medications Reviewed:  
 
Current Facility-Administered Medications Medication Dose Route Frequency  LORazepam (INTENSOL) 2 mg/mL oral concentrate 1 mg  1 mg Oral Q1H PRN  
 acetaminophen (TYLENOL) tablet 650 mg  650 mg Oral Q4H PRN Or  
 acetaminophen (TYLENOL) solution 650 mg  650 mg Oral Q4H PRN Or  
 acetaminophen (TYLENOL) suppository 650 mg  650 mg Rectal Q4H PRN  
 aspirin chewable tablet 162 mg  162 mg Oral BID  sodium chloride (NS) flush 5-10 mL  5-10 mL IntraVENous Q8H  
 sodium chloride (NS) flush 5-10 mL  5-10 mL IntraVENous PRN  
 acetaminophen (TYLENOL) tablet 650 mg  650 mg Oral Q4H PRN Or  
 acetaminophen (TYLENOL) solution 650 mg  650 mg Per NG tube Q4H PRN Or  
 acetaminophen (TYLENOL) suppository 650 mg  650 mg Rectal Q4H PRN  
 ondansetron (ZOFRAN) injection 4 mg  4 mg IntraVENous Q6H PRN  
 clopidogrel (PLAVIX) tablet 75 mg  75 mg Oral DAILY  labetalol (NORMODYNE) tablet 200 mg  200 mg Oral BID  losartan (COZAAR) tablet 50 mg  50 mg Oral QHS  famotidine (PEPCID) tablet 20 mg  20 mg Oral DAILY  therapeutic multivitamin (THERAGRAN) tablet 1 Tab  1 Tab Oral DAILY  calcium-vitamin D (OS-DEX) 500 mg-200 unit tablet  1 Tab Oral DAILY  lactobac ac& pc-s.therm-b.anim (ALY Q/RISAQUAD)  1 Cap Oral DAILY  levothyroxine (SYNTHROID) tablet 75 mcg  75 mcg Oral ACB  pravastatin (PRAVACHOL) tablet 20 mg  20 mg Oral QHS  furosemide (LASIX) tablet 40 mg  40 mg Oral DAILY  
 
______________________________________________________________________ EXPECTED LENGTH OF STAY: 2d 9h 
ACTUAL LENGTH OF STAY:          2 
 
            
 Myla Mello MD

## 2018-11-03 VITALS
DIASTOLIC BLOOD PRESSURE: 71 MMHG | BODY MASS INDEX: 28.84 KG/M2 | TEMPERATURE: 100.3 F | HEIGHT: 59 IN | WEIGHT: 143.08 LBS | SYSTOLIC BLOOD PRESSURE: 184 MMHG | OXYGEN SATURATION: 97 % | HEART RATE: 66 BPM | RESPIRATION RATE: 15 BRPM

## 2018-11-03 RX ORDER — LORAZEPAM 2 MG/ML
1 CONCENTRATE ORAL
Qty: 1 BOTTLE | Refills: 0 | Status: SHIPPED | OUTPATIENT
Start: 2018-11-03

## 2018-11-03 RX ORDER — ACETAMINOPHEN 325 MG/1
650 TABLET ORAL
Qty: 20 TAB | Refills: 1 | Status: SHIPPED | OUTPATIENT
Start: 2018-11-03

## 2018-11-03 NOTE — PROGRESS NOTES
Problem: Falls - Risk of 
Goal: *Absence of Falls Document Vonnie Munson Fall Risk and appropriate interventions in the flowsheet. Outcome: Progressing Towards Goal 
Fall Risk Interventions: 
Mobility Interventions: Communicate number of staff needed for ambulation/transfer, Patient to call before getting OOB, Strengthening exercises (ROM-active/passive) Mentation Interventions: Adequate sleep, hydration, pain control, Door open when patient unattended, Family/sitter at bedside, More frequent rounding Medication Interventions: Evaluate medications/consider consulting pharmacy Elimination Interventions: Call light in reach, Patient to call for help with toileting needs

## 2018-11-03 NOTE — PROGRESS NOTES
Problem: Falls - Risk of 
Goal: *Absence of Falls Document April Viridiana Fall Risk and appropriate interventions in the flowsheet. Outcome: Progressing Towards Goal 
Fall Risk Interventions: 
Mobility Interventions: Communicate number of staff needed for ambulation/transfer, Patient to call before getting OOB Mentation Interventions: Adequate sleep, hydration, pain control, Bed/chair exit alarm, Door open when patient unattended, Family/sitter at bedside, Room close to nurse's station Medication Interventions: Bed/chair exit alarm, Patient to call before getting OOB, Evaluate medications/consider consulting pharmacy, Teach patient to arise slowly Elimination Interventions: Bed/chair exit alarm, Call light in reach, Toileting schedule/hourly rounds Problem: Pressure Injury - Risk of 
Goal: *Prevention of pressure injury Document Dustin Scale and appropriate interventions in the flowsheet. Outcome: Progressing Towards Goal 
Pressure Injury Interventions: 
Sensory Interventions: Assess changes in LOC, Assess need for specialty bed, Discuss PT/OT consult with provider, Float heels, Keep linens dry and wrinkle-free, Minimize linen layers, Pad between skin to skin Moisture Interventions: Absorbent underpads, Apply protective barrier, creams and emollients, Assess need for specialty bed, Internal/External fecal devices, Minimize layers Activity Interventions: Increase time out of bed, Pressure redistribution bed/mattress(bed type) Mobility Interventions: HOB 30 degrees or less, Pressure redistribution bed/mattress (bed type), Turn and reposition approx. every two hours(pillow and wedges) Nutrition Interventions: Document food/fluid/supplement intake Friction and Shear Interventions: HOB 30 degrees or less, Lift sheet, Lift team/patient mobility team, Minimize layers

## 2018-11-03 NOTE — PROGRESS NOTES
Bedside shift change report given to [address] [place] [address] (oncoming nurse) by Devonte Diaz RN (offgoing nurse).  Report included the following information SBAR, MAR, Recent Results and Cardiac Rhythm SA.

## 2018-11-03 NOTE — DISCHARGE SUMMARY
Discharge Summary       PATIENT ID: Lisa Kuo  MRN: [id number]   YOB: 1926    DATE OF ADMISSION: 10/31/2018  2:25 PM    DATE OF DISCHARGE: 11/3/2018   PRIMARY CARE PROVIDER: Chuy Carrion MD     ATTENDING PHYSICIAN: Dr Rob Olivia  DISCHARGING PROVIDER: Rob Olivia MD    To contact this individual call [phone] and ask the  to page. If unavailable ask to be transferred the Adult Hospitalist Department. CONSULTATIONS: IP CONSULT TO HOSPITALIST  IP CONSULT TO NEUROLOGY  IP CONSULT TO PALLIATIVE CARE - PROVIDER    PROCEDURES/SURGERIES: * No surgery found *    ADMITTING [facility] COURSE:   AMS in a patient with history of TIA  -likely sec to TIA/Stroke  -CT head 10/31 Possible new infarct at the junction of the right rubina and midbrain, compared with the 2014 study. However, age indeterminate  -CTA 10/31 No acute large vessel arterial occlusion. Moderate to severe intracranial atherosclerosis with severe critical stenosis in the proximal posterior division M2 segment left middle cerebral artery  -Appreciate Neurology  -The patient still not safe enough to swallow. Looks worse today  -On ASA/Plavix but not safe enough to have them    Fever overnight  -symptomatic treatment, will not do a work up. The family agrees    CAD  -continue home meds    CKD stage III  -stable    Hypothyroidism  -on synthroid, continue    Cardiac diet     PT/OT SNF    Code status: DNR. Appreciate palliative care  DVT prophylaxis: scd        DISCHARGE DIAGNOSES / PLAN:      1. Acute CVA  2.  Altered mental state       PENDING TEST RESULTS:   At the time of discharge the following test results are still pending: none    FOLLOW UP APPOINTMENTS:    Follow-up Information     Follow up With Specialties Details Why Contact Info    At [facility] Call on 11/3/2018 Hospice, call if you have not heard from agency by 12:00 p.m. [address] [place] [address]  [phone]  [phone] ADDITIONAL CARE RECOMMENDATIONS:   Follow up with PMD    DIET: Comfort feeding    ACTIVITY: Bedrest      DISCHARGE MEDICATIONS:  Current Discharge Medication List      START taking these medications    Details   LORazepam (INTENSOL) 2 mg/mL concentrated solution Take 0.5 mL by mouth every one (1) hour as needed. Max Daily Amount: 24 mg. Qty: 1 Bottle, Refills: 0    Associated Diagnoses: Altered mental status, unspecified altered mental status type      acetaminophen (TYLENOL) 325 mg tablet Take 2 Tabs by mouth every four (4) hours as needed for Fever (For temp greater than or equal to 38.5 C or 101.3 F (Unless hepatic failure or contrindicated). Give first line for fever. ). Qty: 20 Tab, Refills: 1         CONTINUE these medications which have NOT CHANGED    Details   pravastatin (PRAVACHOL) 20 mg tablet Take 20 mg by mouth nightly. aspirin delayed-release 81 mg tablet Take 162 mg by mouth two (2) times a day. furosemide (LASIX) 40 mg tablet Take 40 mg by mouth daily. B.infantis-B.ani-B.long-B.bifi (PROBIOTIC 4X) 10-15 mg TbEC Take 1 capsule by mouth daily. levothyroxine (SYNTHROID) 75 mcg tablet Take 75 mcg by mouth every other day. Alternate with 50mcg dose      clopidogrel (PLAVIX) 75 mg tablet Take 75 mg by mouth daily. labetalol (NORMODYNE) 200 mg tablet Take 200 mg by mouth two (2) times a day. olmesartan (BENICAR) 20 mg tablet Take 20 mg by mouth nightly. ranitidine (ZANTAC) 150 mg tablet Take 150 mg by mouth two (2) times a day.          STOP taking these medications       cyanocobalamin (VITAMIN B-12) 1,000 mcg tablet Comments:   Reason for Stopping:         cholecalciferol, vitamin D3, (VITAMIN D3) 2,000 unit tab Comments:   Reason for Stopping:         KRILL/OM3/DHA/EPA/OM6/LIP/ASTX (KRILL OIL, OMEGA 3 & 6, PO) Comments:   Reason for Stopping:         Calcium Carbonate-Vit D3-Min (CALTRATE 600+D PLUS MINERALS) 600 mg calcium- 400 unit tab Comments:   Reason for Stopping:         biotin 2,500 mcg tab Comments:   Reason for Stopping:         multivitamin, stress formula (STRESS TAB) tablet Comments:   Reason for Stopping:                 NOTIFY YOUR PHYSICIAN FOR ANY OF THE FOLLOWING:   Fever over 101 degrees for 24 hours. Chest pain, shortness of breath, fever, chills, nausea, vomiting, diarrhea, change in mentation, falling, weakness, bleeding. Severe pain or pain not relieved by medications. Or, any other signs or symptoms that you may have questions about.     DISPOSITION:  x  Home With:   OT  PT  HH  RN       Long term SNF/Inpatient Rehab    Independent/assisted living   x Hospice    Other:       PATIENT CONDITION AT DISCHARGE:     Functional status   x Poor     Deconditioned     Independent      Cognition     Lucid     Forgetful    x Dementia      Catheters/lines (plus indication)    Escalante     PICC     PEG    x None      Code status     Full code    x DNR      PHYSICAL EXAMINATION AT DISCHARGE:  Please see progress note      CHRONIC MEDICAL DIAGNOSES:  Problem List as of 11/3/2018 Date Reviewed: 10/31/2018          Codes Class Noted - Resolved    * (Principal) Altered mental state ICD-10-CM: R41.82  ICD-9-CM: 780.97  10/31/2018 - Present        Altered mental status ICD-10-CM: R41.82  ICD-9-CM: 780.97  12/22/2014 - Present        UTI (lower urinary tract infection) ICD-10-CM: N39.0  ICD-9-CM: 599.0  7/26/2014 - Present              Greater than 32 minutes were spent with the patient on counseling and coordination of care    Signed:   Isabella Elmore MD  11/3/2018  7:50 AM

## 2018-11-03 NOTE — PROGRESS NOTES
Hospitalist Progress Note Carlene Olson MD 
Answering service: [phone] from in house phone Cell: [phone] Date of Service:  11/3/2018 NAME:  Boris Dominguez :  1926 MRN:  [id number] Admission Summary:  
Briefly, this is a 72-year-old  female with a past medical history of hypertension, multiple TIAs, coronary artery disease. She comes over here because of changes in mental status that was noted around 10:00 to 10:30 a.m. this morning. According to the daughter, the patient was quite at her baseline yesterday at around 10 p.m. when she had her regular meal, and had a shower, and then she had gone up to bed. Overnight, the patient must have woken up once and had used the restroom at her baseline, but this morning between 10:00 to 10:30 when the daughter went in the patient's room to check on her, the patient was sitting on her chair and had significant slurred speech, was not comprehending as usual, and had gaze on her left side. The daughter mentioned that this has happened in the past quite frequently, but the patient usually comes back to her baseline pretty quickly. This time, even after waiting for a couple of hours, the patient did not come back to her usual self; that is when the daughter brought the patient into the ER. No chest pain, shortness of breath, headache, dizziness, cough, fever. No diarrhea. No other changes in bowel movements. Interval history / Subjective:  
  F/u changes in mental status Fever overnight Assessment & Plan:  
 
AMS in a patient with history of TIA 
-likely sec to TIA/Stroke 
-CT head 10/31 Possible new infarct at the junction of the right rubina and midbrain, compared with the  study. However, age indeterminate 
-CTA 10/31 No acute large vessel arterial occlusion.  Moderate to severe intracranial atherosclerosis with severe critical stenosis in the proximal posterior division M2 segment left middle cerebral artery 
-Appreciate Neurology 
-The patient still not safe enough to swallow. Looks worse today 
-On ASA/Plavix but not safe enough to have them Fever overnight 
-symptomatic treatment, will not do a work up. The family agrees CAD 
-continue home meds CKD stage III 
-stable Hypothyroidism 
-on synthroid, continue Cardiac diet PT/OT SNF Code status: DNR. Appreciate palliative care DVT prophylaxis: scd PTA: home with daughter Julián Marquez home with hospice today Care Plan discussed with: Patient/Family Disposition: as above, home with hospice Hospital Problems  Date Reviewed: 10/31/2018 Codes Class Noted POA * (Principal) Altered mental state ICD-10-CM: R41.82 
ICD-9-CM: 780.97  10/31/2018 Yes Review of Systems: A comprehensive review of systems was negative except for that written in the HPI. Vital Signs:  
 Last 24hrs VS reviewed since prior progress note. Most recent are: 
Visit Vitals /88 (BP 1 Location: Right arm, BP Patient Position: At rest) Pulse 80 Temp 100.2 °F (37.9 °C) Resp 19 Ht 4' 11\" (1.499 m) Wt 64.9 kg (143 lb 1.3 oz) SpO2 96% BMI 28.90 kg/m² No intake or output data in the 24 hours ending 11/03/18 0744 Physical Examination:  
 
 
     
Constitutional:  No acute distress, drowsy but arousable ENT:  Oral mucous moist, oropharynx benign. Neck supple, Resp:  CTA bilaterally. No wheezing/rhonchi/rales. No accessory muscle use CV:  Regular rhythm, normal rate, no murmurs, gallops, rubs GI:  Soft, non distended, non tender. normoactive bowel sounds, no hepatosplenomegaly Musculoskeletal:  No edema, warm, 2+ pulses throughout Neurologic:  not moving her extremities specially RUE, RLE Skin: no rashes, no ulcers Data Review: Review and/or order of clinical lab test 
 
 
Labs:  
 
Recent Labs 11/02/18 
0257 11/01/18 
8163 WBC 13.9* 12.4* HGB 11.5 12.0 HCT 35.2 37.6  190 Recent Labs 11/02/18 
0257 10/31/18 
1459  138  
K 3.6 4.0  
 101 CO2 23 29 BUN 13 18 CREA 0.92 0.94 * 104* CA 8.6 8.1* Recent Labs 10/31/18 
1459 SGOT 18 ALT 14 AP 60 TBILI 0.3 TP 5.8* ALB 2.8*  
GLOB 3.0 Recent Labs 10/31/18 
1459 INR 1.1 PTP 11.2* APTT 27.6 No results for input(s): FE, TIBC, PSAT, FERR in the last 72 hours. Lab Results Component Value Date/Time Folate 30.6 (H) 12/23/2014 03:36 AM  
  
No results for input(s): PH, PCO2, PO2 in the last 72 hours. Recent Labs 10/31/18 
1459 TROIQ <0.05 Lab Results Component Value Date/Time Cholesterol, total 187 11/01/2018 03:36 AM  
 HDL Cholesterol 80 11/01/2018 03:36 AM  
 LDL, calculated 95.8 11/01/2018 03:36 AM  
 Triglyceride 56 11/01/2018 03:36 AM  
 CHOL/HDL Ratio 2.3 11/01/2018 03:36 AM  
 
Lab Results Component Value Date/Time Glucose (POC) 105 12/26/2014 05:01 PM  
 Glucose (POC) 162 (H) 12/26/2014 12:21 PM  
 Glucose (POC) 136 (H) 12/26/2014 08:08 AM  
 Glucose (POC) 92 12/25/2014 10:01 PM  
 Glucose (POC) 97 12/23/2014 09:50 PM  
 
Lab Results Component Value Date/Time  Color YELLOW/STRAW 10/31/2018 02:59 PM  
 Appearance CLEAR 10/31/2018 02:59 PM  
 Specific gravity 1.026 10/31/2018 02:59 PM  
 Specific gravity 1.025 12/22/2014 05:20 PM  
 pH (UA) 6.5 10/31/2018 02:59 PM  
 Protein NEGATIVE  10/31/2018 02:59 PM  
 Glucose NEGATIVE  10/31/2018 02:59 PM  
 Ketone NEGATIVE  10/31/2018 02:59 PM  
 Bilirubin NEGATIVE  10/31/2018 02:59 PM  
 Urobilinogen 0.2 10/31/2018 02:59 PM  
 Nitrites NEGATIVE  10/31/2018 02:59 PM  
 Leukocyte Esterase TRACE (A) 10/31/2018 02:59 PM  
 Epithelial cells FEW 10/31/2018 02:59 PM  
 Bacteria NEGATIVE  10/31/2018 02:59 PM  
 WBC 0-4 10/31/2018 02:59 PM  
 RBC 0-5 10/31/2018 02:59 PM  
 
 
 
Medications Reviewed:  
 
Current Facility-Administered Medications Medication Dose Route Frequency  LORazepam (INTENSOL) 2 mg/mL oral concentrate 1 mg  1 mg Oral Q1H PRN  
 aspirin chewable tablet 162 mg  162 mg Oral BID  sodium chloride (NS) flush 5-10 mL  5-10 mL IntraVENous Q8H  
 sodium chloride (NS) flush 5-10 mL  5-10 mL IntraVENous PRN  
 acetaminophen (TYLENOL) tablet 650 mg  650 mg Oral Q4H PRN Or  
 acetaminophen (TYLENOL) solution 650 mg  650 mg Per NG tube Q4H PRN Or  
 acetaminophen (TYLENOL) suppository 650 mg  650 mg Rectal Q4H PRN  
 ondansetron (ZOFRAN) injection 4 mg  4 mg IntraVENous Q6H PRN  
 clopidogrel (PLAVIX) tablet 75 mg  75 mg Oral DAILY  labetalol (NORMODYNE) tablet 200 mg  200 mg Oral BID  losartan (COZAAR) tablet 50 mg  50 mg Oral QHS  famotidine (PEPCID) tablet 20 mg  20 mg Oral DAILY  therapeutic multivitamin (THERAGRAN) tablet 1 Tab  1 Tab Oral DAILY  calcium-vitamin D (OS-DEX) 500 mg-200 unit tablet  1 Tab Oral DAILY  lactobac ac& pc-s.therm-b.anim (ALY Q/RISAQUAD)  1 Cap Oral DAILY  levothyroxine (SYNTHROID) tablet 75 mcg  75 mcg Oral ACB  pravastatin (PRAVACHOL) tablet 20 mg  20 mg Oral QHS  furosemide (LASIX) tablet 40 mg  40 mg Oral DAILY  
 
______________________________________________________________________ EXPECTED LENGTH OF STAY: 2d 9h 
ACTUAL LENGTH OF STAY:          3 Rob Olivia MD

## 2018-11-03 NOTE — DISCHARGE INSTRUCTIONS
Discharge Instructions       PATIENT ID: Alin Manrique  MRN: [id number]   YOB: 1926    DATE OF ADMISSION: 10/31/2018  2:25 PM    DATE OF DISCHARGE: 11/3/2018    PRIMARY CARE PROVIDER: Jennifer Bowles MD     ATTENDING PHYSICIAN: Nan Wolfe MD  DISCHARGING PROVIDER: Aubry Heimlich, MD    To contact this individual call [phone] and ask the  to page. If unavailable ask to be transferred the Adult Hospitalist Department. DISCHARGE DIAGNOSES   Acute CVA  Altered mental state    CONSULTATIONS: IP CONSULT TO HOSPITALIST  IP CONSULT TO NEUROLOGY  IP CONSULT TO PALLIATIVE CARE - PROVIDER    PROCEDURES/SURGERIES: * No surgery found *    PENDING TEST RESULTS:   At the time of discharge the following test results are still pending: none    FOLLOW UP APPOINTMENTS:   Follow-up Information     Follow up With Specialties Details Why Contact Info    At [facility] Call on 11/3/2018 Hospice, call if you have not heard from agency by 12:00 p.m. Bebeto Cole [facility] [phone]           ADDITIONAL CARE RECOMMENDATIONS:   Follow up with PMD  Please call hospice if any questions    DIET: Comfort feeding    ACTIVITY: Bedrest      DISCHARGE MEDICATIONS:   See Medication Reconciliation Form    · It is important that you take the medication exactly as they are prescribed. · Keep your medication in the bottles provided by the pharmacist and keep a list of the medication names, dosages, and times to be taken in your wallet. · Do not take other medications without consulting your doctor. NOTIFY YOUR PHYSICIAN FOR ANY OF THE FOLLOWING:   Fever over 101 degrees for 24 hours. Chest pain, shortness of breath, fever, chills, nausea, vomiting, diarrhea, change in mentation, falling, weakness, bleeding. Severe pain or pain not relieved by medications. Or, any other signs or symptoms that you may have questions about.       DISPOSITION:  x  Home With:   OT  PT [place]  RN       SNF/Inpatient Rehab/LTAC    Independent/assisted living    Hospice    Other:     CDMP Checked:   Yes x     PROBLEM LIST Updated:  Yes x       Signed:   Carlito Fang MD  11/3/2018  7:48 AM

## 2018-11-03 NOTE — PROGRESS NOTES
Stroke Education documented in Patient Education: YES Core Measures Documented in [facility]: 
Risk Factors: YES Warning signs of stroke: YES When to Activate 911: YES Medication Education for Risk Factors: YES Smoking cessation if applicable: YES Written Education Given:  Krishan Barney Discharge NIH Completed: YES Score: 24 BRAINS: NO Follow Up Appointment Made: YES Date/Time if applicable: HOSPICE AT HOME by 2pm. Bedside RN performed patient education and medication education. Discharge concerns initiated and discussed with patient, including clarification on \"who\" assists the patient at their home and instructions for when the home going patient should call their provider after discharge. Opportunity for questions and clarification was provided. Patient receptive to education: YES Patient stated: Aphasia Barriers to Education: AMS education given to daughter POA Diagnosis Education given:  YES Length of stay: 3 Expected Day of Discharge: 11/3/18 Ask if they have \"Help at Home\" & add to white board? YES Education Day #: 3 Medication Education Given:  YES 
M in the box Medication name: lorazepam 
 
Pt aware of [facility] survey: YES I have reviewed discharge instructions with the caregiver. The caregiver verbalized understanding. Patient discharged via EMS to home with hospice. Hospice equipment delivered before patient was discharged.